# Patient Record
Sex: FEMALE | Race: WHITE | NOT HISPANIC OR LATINO | Employment: UNEMPLOYED | URBAN - METROPOLITAN AREA
[De-identification: names, ages, dates, MRNs, and addresses within clinical notes are randomized per-mention and may not be internally consistent; named-entity substitution may affect disease eponyms.]

---

## 2017-03-09 ENCOUNTER — OFFICE VISIT (OUTPATIENT)
Dept: INTERNAL MEDICINE | Facility: MEDICAL CENTER | Age: 62
End: 2017-03-09
Payer: MEDICAID

## 2017-03-09 VITALS
TEMPERATURE: 98.2 F | BODY MASS INDEX: 27.97 KG/M2 | DIASTOLIC BLOOD PRESSURE: 64 MMHG | RESPIRATION RATE: 17 BRPM | SYSTOLIC BLOOD PRESSURE: 98 MMHG | HEIGHT: 66 IN | WEIGHT: 174 LBS | OXYGEN SATURATION: 91 % | HEART RATE: 86 BPM

## 2017-03-09 DIAGNOSIS — S37.009A: ICD-10-CM

## 2017-03-09 DIAGNOSIS — Z85.850 HX OF THYROID CANCER: ICD-10-CM

## 2017-03-09 DIAGNOSIS — M67.40 GANGLION CYST: ICD-10-CM

## 2017-03-09 DIAGNOSIS — E78.5 HYPERLIPIDEMIA, UNSPECIFIED HYPERLIPIDEMIA TYPE: ICD-10-CM

## 2017-03-09 PROCEDURE — 99213 OFFICE O/P EST LOW 20 MIN: CPT | Mod: GE | Performed by: INTERNAL MEDICINE

## 2017-03-09 RX ORDER — SIMVASTATIN 40 MG
40 TABLET ORAL EVERY EVENING
Qty: 30 TAB | Refills: 11 | Status: SHIPPED | OUTPATIENT
Start: 2017-03-09 | End: 2018-04-05 | Stop reason: SDUPTHER

## 2017-03-09 ASSESSMENT — ACTIVITIES OF DAILY LIVING (ADL): TRANSPORTATION COMMENTS: NO CAR

## 2017-03-09 ASSESSMENT — PAIN SCALES - GENERAL: PAINLEVEL: NO PAIN

## 2017-03-09 ASSESSMENT — PATIENT HEALTH QUESTIONNAIRE - PHQ9: CLINICAL INTERPRETATION OF PHQ2 SCORE: 0

## 2017-03-09 NOTE — PROGRESS NOTES
"Subjective:   CC: follow up    Chastity Blanco is a 61 y.o. female who presents to the [facility] for a follow up visit.    She has a PMHx of Follicular thyroid cancer s/p post total thyroidectomy in 2006 and radiation, vitamin D deficiency, COPD, dyslipidemia, bipolar disorder and depression.    Patient reports to be doing well. She does mention about a small \"bump\" on left 5th digit which appeared a few months ago. She notes this is not painful or pruritic, denies any inciting trauma.   Discussed lab work with her. She continues to smoke less than half a pack a day now. Reports to be compliant with medications, except simvastatin as she did not realize she was supposed to continue.    She is following Dr. Box for depression and bipolar disorder. She's on olanzapine, benztropine and celexa.     Patient Active Problem List    Diagnosis Date Noted   • Hx of thyroid cancer 06/16/2016   • Vitamin D deficiency 06/16/2016   • Tobacco abuse 06/16/2016   • COPD (chronic obstructive pulmonary disease) (CMS-HCC) 06/16/2016   • Hyperlipidemia 06/16/2016   • Bipolar disorder (CMS-HCC) 06/16/2016   • Depression 06/16/2016       Current medicines (including changes today)  Allergies:Review of patient's allergies indicates no known allergies.  Current Outpatient Prescriptions   Medication Sig Dispense Refill   • citalopram (CELEXA) 40 MG Tab      • FLUVIRIN Suspension ADM 0.5ML IM UTD  0   • olanzapine (ZYPREXA) 10 MG tablet 10 mg.  4   • benztropine (COGENTIN) 0.5 MG Tab 0.5 mg.  4   • albuterol (PROVENTIL) 2.5mg/3ml Nebu Soln solution for nebulization 3 mL by Nebulization route every four hours as needed for Shortness of Breath. 75 mL 3   • citalopram (CELEXA) 10 MG tablet Take 1 Tab by mouth every day. 30 Tab 3   • divalproex (DEPAKOTE) 500 MG Tablet Delayed Response Take 1 Tab by mouth every day. One tablet by mouth once every night at bedtime 90 Tab 3   • levothyroxine (SYNTHROID) 175 MCG Tab Take 1 Tab by mouth " "Every morning on an empty stomach. 30 Tab 3   • simvastatin (ZOCOR) 40 MG Tab Take 1 Tab by mouth every evening. 30 Tab 3   • tiotropium (SPIRIVA) 18 MCG Cap Inhale 1 Cap by mouth every day. 30 Cap 3     No current facility-administered medications for this visit.     She  has a past medical history of Thyroid disorder (removed); thyroid cancer (6/16/2016); Vitamin D deficiency (6/16/2016); Tobacco abuse (6/16/2016); COPD (chronic obstructive pulmonary disease) (CMS-HCC) (6/16/2016); Hyperlipidemia (6/16/2016); Bipolar disorder (CMS-HCC) (6/16/2016); and Depression (6/16/2016). She also has no past medical history of Breast cancer (CMS-HCC).  Family History   Problem Relation Age of Onset   • Cancer Mother      Breast     No family status information on file.     Social History   Substance Use Topics   • Smoking status: Current Every Day Smoker -- 1.00 packs/day for 40 years     Types: Cigarettes   • Smokeless tobacco: Never Used   • Alcohol Use: No      Comment: occ.        Review of Systems:     Negative except as mentioned in HPI.    Constitutional: Denies fevers, Denies weight changes  Eyes: Denies changes in vision, no eye pain  Cardiovascular: Denies chest pain or palpitations   Respiratory: Denies shortness of breath , Denies cough  Gastrointestinal/Hepatic: Denies abdominal pain, nausea, vomiting, diarrhea, constipation or GI bleeding   Genitourinary: Denies bladder dysfunction, dysuria or frequency  Musculoskeletal/Rheum: Denies  joint pain and swelling   Skin/Breast: Denies rash  Neurological: Denies headache, confusion, memory loss or focal weakness/parasthesias  All other systems were reviewed and are negative (AMA/CMS criteria)               Objective:     Blood pressure 98/64, pulse 86, temperature 36.8 °C (98.2 °F), resp. rate 17, height 1.676 m (5' 6\"), weight 78.926 kg (174 lb), SpO2 91 %, not currently breastfeeding. Body mass index is 28.1 kg/(m^2).   Constitutional: Alert and oriented, No acute " distress   HEENT: Normocephalic, Atraumatic, Bilateral external ears normal, slight brownish discoloration on tongue, no tenderness, no oral thrush noted  Eyes: PERRL, EOMI, Conjunctiva normal, No discharge.   Neck: Normal range of motion, thyroidectomy scar noted  Cardiovascular: Normal heart rate, Normal rhythm, No murmurs    Lungs:  Clear to auscultation bilaterally  Skin: possibly ganglion cyst on left 5th digit   Neurologic: Normal motor function, No focal deficits noted, cranial nerves II through XII are normal  Psychiatric: Affect normal, Judgment normal, Mood normal.   Extremities: B/L Peripheral Pulses +, No Edema        Assessment and Plan:     1. Hyperlipidemia, unspecified hyperlipidemia type  Most recent lipid panel consistent with uncontrolled hyperlipidemia  Elevated cholesterol, triglycerides, LDL and low HDL level  Per patient she has not been taking her simvastatin as she was not aware she was supposed to continue. She notes that she did not get any refills and that she was supposed to take it  Discussed with patient to call pharmacy when she runs out of medications and future. Reordered simvastatin with 11 refills  Recommended to eat diet high in omega-3 fatty acids including avocado, fish oil, fish, and nuts  - simvastatin (ZOCOR) 40 MG Tab; Take 1 Tab by mouth every evening.  Dispense: 30 Tab; Refill: 11    2. Hx of Follicular thyroid cancer  Hypothyroidism s/p total thyroidectomy   Most recent Tg Ab 1; no thyrglobulin level done   Patient is following Dr. Jacinto (endocrinologist) and will get in touch with Dr. Jacinto regarding thyroglobulin antibody level and follow up  She is continued on levothyroxine 175 µg daily      3. Kidney injury, unspecified laterality, initial encounter  Mildly elevated Cr 1.08 compared to previous  Possibly due to dehydration; denies NSAID use. Not on any nephrotoxic medications  Instructed patient to drink plenty of water daily  Repeat chemistry prior to next  visit  - BASIC METABOLIC PANEL; Future    4. Ganglion cyst  Patient with likely ganglion cyst on left 5th digit; not painful or pruritic. No inciting trauma and no other precipitating factors. Has not changed or increased in size. No warmth or tenderness on palpation  Instructed patient we can continue to monitor. Gave her options that we can consider further testing including x-ray or ultrasound and possibly referral to surgeon or ortho for aspiration if needed.  We will continue to monitor for now      5. Tobacco abuse  Has been counseled multiple times previously on quitting smoking  She is informed about the risk factors of smoking and that we can try nicotine patches or gum whenever she is ready   She has cut down her smoking to 8 cig/day now       6. Preventive health care  Had mammogram last year  colonoscopy two yrs ago  Colonoscopy recommended in 8 yrs  Flu shot in Oct/16      Followup:   Therapy was discussed in detail.    Please note that this dictation was created using voice recognition software. I have made every reasonable attempt to correct obvious errors, but I expect that there are errors of grammar and possibly content that I did not discover before finalizing the note.

## 2017-03-09 NOTE — MR AVS SNAPSHOT
"        Chastity Holtanastasia   3/9/2017 1:15 PM   Office Visit   MRN: [id number]    Department:  [facility] - Internal Med   Dept Phone:  [phone]    Description:  Female : 1955   Provider:  Lucia Horan M.D.           Reason for Visit     Wound Infection left little finger infection mass times 2 months      Allergies as of 3/9/2017     No Known Allergies      You were diagnosed with     Hyperlipidemia, unspecified hyperlipidemia type   [1054378]       Hx of thyroid cancer   [303383]       Kidney injury, unspecified laterality, initial encounter   [634521]         Vital Signs     Blood Pressure Pulse Temperature Respirations Height Weight    98/64 mmHg 86 36.8 °C (98.2 °F) 17 1.676 m (5' 6\") 78.926 kg (174 lb)    Body Mass Index Oxygen Saturation Breastfeeding? Smoking Status          28.10 kg/m2 91% No Current Every Day Smoker        Basic Information     Date Of Birth Sex Race Ethnicity Preferred Language    1955 Female White Non- English      Your appointments     2017  1:45 PM   Established Patient with Lucia Horan M.D.   [facility] - Internal Medicine (--)    [address] [place] [address] [id number] [phone]           You will be receiving a confirmation call a few days before your appointment from our automated call confirmation system.              Problem List              ICD-10-CM Priority Class Noted - Resolved    Hx of thyroid cancer Z85.850   2016 - Present    Vitamin D deficiency E55.9   2016 - Present    Tobacco abuse Z72.0   2016 - Present    COPD (chronic obstructive pulmonary disease) (CMS-HCC) J44.9   2016 - Present    Hyperlipidemia E78.5   2016 - Present    Bipolar disorder (CMS-HCC) F31.9   2016 - Present    Depression F32.9   2016 - Present      Health Maintenance        Date Due Completion Dates    PAP SMEAR 10/16/1977 ---    COLONOSCOPY 10/16/2006 ---    MAMMOGRAM 2013, " 4/12/2011    IMM ZOSTER VACCINE 10/16/2016 ---    IMM DTaP/Tdap/Td Vaccine (2 - Td) 11/5/2017 11/5/2007            Current Immunizations     13-VALENT PCV PREVNAR 1/29/2015    Influenza Vaccine Quad Inj (Pf) 10/3/2016    Pneumococcal polysaccharide vaccine (PPSV-23) 3/31/2015    Tdap Vaccine 11/5/2007      Below and/or attached are the medications your provider expects you to take. Review all of your home medications and newly ordered medications with your provider and/or pharmacist. Follow medication instructions as directed by your provider and/or pharmacist. Please keep your medication list with you and share with your provider. Update the information when medications are discontinued, doses are changed, or new medications (including over-the-counter products) are added; and carry medication information at all times in the event of emergency situations     Allergies:  No Known Allergies          Medications  Valid as of: March 09, 2017 -  1:57 PM    Generic Name Brand Name Tablet Size Instructions for use    Albuterol Sulfate (Nebu Soln) PROVENTIL 2.5mg/3ml 3 mL by Nebulization route every four hours as needed for Shortness of Breath.        Benztropine Mesylate (Tab) COGENTIN 0.5 MG 0.5 mg.        Citalopram Hydrobromide (Tab) CELEXA 40 MG         Influenza Vac Typ A&B Surf Ant (Suspension) FLUVIRIN  ADM 0.5ML IM UTD        Levothyroxine Sodium (Tab) SYNTHROID 175 MCG Take 1 Tab by mouth Every morning on an empty stomach.        OLANZapine (Tab) ZYPREXA 10 MG 10 mg.        Simvastatin (Tab) ZOCOR 40 MG Take 1 Tab by mouth every evening.        Tiotropium Bromide Monohydrate (Cap) SPIRIVA 18 MCG Inhale 1 Cap by mouth every day.        .                 Medicines prescribed today were sent to:     [id number] - [place] - [address] [facility] [employer]    [address] [address]    Phone: [phone] Fax: [phone]    Open 24 Hours?: No      Medication refill  instructions:       If your prescription bottle indicates you have medication refills left, it is not necessary to call your provider’s office. Please contact your pharmacy and they will refill your medication.    If your prescription bottle indicates you do not have any refills left, you may request refills at any time through one of the following ways: The online [employer] system (except Urgent Care), by calling your provider’s office, or by asking your pharmacy to contact your provider’s office with a refill request. Medication refills are processed only during regular business hours and may not be available until the next business day. Your provider may request additional information or to have a follow-up visit with you prior to refilling your medication.   *Please Note: Medication refills are assigned a new Rx number when refilled electronically. Your pharmacy may indicate that no refills were authorized even though a new prescription for the same medication is available at the pharmacy. Please request the medicine by name with the pharmacy before contacting your provider for a refill.        Your To Do List     Future Labs/Procedures Complete By Expires    BASIC METABOLIC PANEL  As directed 3/9/2018      Instructions    Drink plenty of water  Get lab test before next visit  Follow up with Dr. Jacinto as scheduled            [facility] Status: Patient Declined        Quit Tobacco Information     Do you want to quit using tobacco?    Quitting tobacco decreases risks of cancer, heart and lung disease, increases life expectancy, improves sense of taste and smell, and increases spending money, among other benefits.    If you are thinking about quitting, we can help.  • [facility] Quit Tobacco Program: 133-228-5822  o Program occurs weekly for four weeks and includes pharmacist consultation on products to support quitting smoking or chewing tobacco. A provider referral is needed for pharmacist consultation.  • Tobacco Users  Help Hotline: -800-QUIT-NOW (622-3782) or https://[place].quitlogix.org/  o Free, confidential telephone and online coaching for [place] residents. Sessions are designed on a schedule that is convenient for you. Eligible clients receive free nicotine replacement therapy.  • Nationally: www.smokefree.gov  o Information and professional assistance to support both immediate and long-term needs as you become, and remain, a non-smoker. Smokefree.gov allows you to choose the help that best fits your needs.

## 2017-06-10 LAB
BUN SERPL-MCNC: 12 MG/DL (ref 8–27)
BUN/CREAT SERPL: 14 (ref 12–28)
CALCIUM SERPL-MCNC: 9.6 MG/DL (ref 8.7–10.3)
CHLORIDE SERPL-SCNC: 102 MMOL/L (ref 96–106)
CO2 SERPL-SCNC: 22 MMOL/L (ref 18–29)
CREAT SERPL-MCNC: 0.86 MG/DL (ref 0.57–1)
GLUCOSE SERPL-MCNC: 84 MG/DL (ref 65–99)
POTASSIUM SERPL-SCNC: 4.2 MMOL/L (ref 3.5–5.2)
SODIUM SERPL-SCNC: 142 MMOL/L (ref 134–144)

## 2017-06-21 ENCOUNTER — OFFICE VISIT (OUTPATIENT)
Dept: INTERNAL MEDICINE | Facility: MEDICAL CENTER | Age: 62
End: 2017-06-21
Payer: MEDICAID

## 2017-06-21 VITALS
DIASTOLIC BLOOD PRESSURE: 78 MMHG | RESPIRATION RATE: 18 BRPM | OXYGEN SATURATION: 93 % | HEIGHT: 66 IN | SYSTOLIC BLOOD PRESSURE: 112 MMHG | WEIGHT: 161.2 LBS | BODY MASS INDEX: 25.91 KG/M2 | TEMPERATURE: 97.5 F | HEART RATE: 85 BPM

## 2017-06-21 DIAGNOSIS — E78.5 HYPERLIPIDEMIA, UNSPECIFIED HYPERLIPIDEMIA TYPE: ICD-10-CM

## 2017-06-21 DIAGNOSIS — J44.9 CHRONIC OBSTRUCTIVE PULMONARY DISEASE, UNSPECIFIED COPD TYPE (HCC): ICD-10-CM

## 2017-06-21 DIAGNOSIS — Z85.850 HX OF THYROID CANCER: ICD-10-CM

## 2017-06-21 DIAGNOSIS — E03.9 HYPOTHYROIDISM, UNSPECIFIED TYPE: ICD-10-CM

## 2017-06-21 DIAGNOSIS — Z12.2 ENCOUNTER FOR SCREENING FOR LUNG CANCER: ICD-10-CM

## 2017-06-21 PROCEDURE — 99213 OFFICE O/P EST LOW 20 MIN: CPT | Mod: GE | Performed by: INTERNAL MEDICINE

## 2017-06-21 RX ORDER — TIOTROPIUM BROMIDE 18 UG/1
18 CAPSULE ORAL; RESPIRATORY (INHALATION) DAILY
Qty: 30 CAP | Refills: 5 | Status: SHIPPED | OUTPATIENT
Start: 2017-06-21 | End: 2018-12-20 | Stop reason: SDUPTHER

## 2017-06-21 ASSESSMENT — PATIENT HEALTH QUESTIONNAIRE - PHQ9: CLINICAL INTERPRETATION OF PHQ2 SCORE: 0

## 2017-06-21 ASSESSMENT — ACTIVITIES OF DAILY LIVING (ADL): TRANSPORTATION COMMENTS: NO CAR

## 2017-06-21 ASSESSMENT — PAIN SCALES - GENERAL: PAINLEVEL: NO PAIN

## 2017-06-21 NOTE — PROGRESS NOTES
Subjective:   CC: Follow up    Chatsity Blanco is a 61 y.o. female who presents to the [facility] for a follow up visit.    She has a PMHx of Follicular thyroid cancer s/p post total thyroidectomy in 2006 and radiation, low vitamin D, COPD, dyslipidemia, bipolar disorder and depression.    She reports she has been doing okay, however her father recently passed away one month ago and she is grieving. She reports to be doing okay though overall and says her mother is doing fine as well. She is usually accompanied to appointments by parents, but today her mother and brother-in-law.    She is following Dr. Box for depression and bipolar disorder. She's on olanzapine, benztropine and celexa.     Patient Active Problem List    Diagnosis Date Noted   • Hx of thyroid cancer 06/16/2016   • Vitamin D deficiency 06/16/2016   • Tobacco abuse 06/16/2016   • COPD (chronic obstructive pulmonary disease) (CMS-HCC) 06/16/2016   • Hyperlipidemia 06/16/2016   • Bipolar disorder (CMS-HCC) 06/16/2016   • Depression 06/16/2016       Current medicines (including changes today)  Allergies:Review of patient's allergies indicates no known allergies.  Current Outpatient Prescriptions   Medication Sig Dispense Refill   • levothyroxine (SYNTHROID) 175 MCG Tab TAKE 1 TABLET BY MOUTH EVERY MORNING ON AN EMPTY STOMACH 90 Tab 1   • simvastatin (ZOCOR) 40 MG Tab Take 1 Tab by mouth every evening. 30 Tab 11   • citalopram (CELEXA) 40 MG Tab      • FLUVIRIN Suspension ADM 0.5ML IM UTD  0   • olanzapine (ZYPREXA) 10 MG tablet 10 mg.  4   • benztropine (COGENTIN) 0.5 MG Tab 0.5 mg.  4   • albuterol (PROVENTIL) 2.5mg/3ml Nebu Soln solution for nebulization 3 mL by Nebulization route every four hours as needed for Shortness of Breath. 75 mL 3   • tiotropium (SPIRIVA) 18 MCG Cap Inhale 1 Cap by mouth every day. 30 Cap 3     No current facility-administered medications for this visit.     She  has a past medical history of Thyroid disorder  (removed); thyroid cancer (6/16/2016); Vitamin D deficiency (6/16/2016); Tobacco abuse (6/16/2016); COPD (chronic obstructive pulmonary disease) (CMS-[facility]) (6/16/2016); Hyperlipidemia (6/16/2016); Bipolar disorder (CMS-HCC) (6/16/2016); and Depression (6/16/2016). She also has no past medical history of Breast cancer (CMS-HCC).  Family History   Problem Relation Age of Onset   • Cancer Mother      Breast     No family status information on file.     Social History   Substance Use Topics   • Smoking status: Current Every Day Smoker -- 1.00 packs/day for 40 years     Types: Cigarettes   • Smokeless tobacco: Never Used   • Alcohol Use: No      Comment: occ.        Review of Systems:     Negative except as mentioned in HPI.    Constitutional: Denies fevers, Denies weight changes  Eyes: Denies changes in vision, no eye pain  Cardiovascular: Denies chest pain or palpitations   Respiratory: Denies shortness of breath , Denies cough  Gastrointestinal/Hepatic: Denies abdominal pain, nausea, vomiting, diarrhea, constipation or GI bleeding   Genitourinary: Denies bladder dysfunction, dysuria or frequency  Musculoskeletal/Rheum: Denies  joint pain and swelling   Skin/Breast: Denies rash  Neurological: Denies headache, confusion, memory loss or focal weakness/parasthesias  All other systems were reviewed and are negative (AMA/CMS criteria)               Objective:     There were no vitals taken for this visit. There is no weight on file to calculate BMI.   Constitutional: Alert and oriented, No acute distress   HEENT: Normocephalic, Atraumatic, Bilateral external ears normal, no tenderness, no oral thrush noted  Eyes: PERRL, EOMI, Conjunctiva normal, No discharge.   Neck: Normal range of motion, thyroidectomy scar noted  Cardiovascular: Normal heart rate, Normal rhythm, No murmurs    Lungs:  Clear to auscultation bilaterally  Neurologic: Normal motor function, No focal deficits noted, cranial nerves II through XII are  normal  Psychiatric: Affect normal, Judgment normal, Mood normal.   Extremities: B/L Peripheral Pulses +, No Edema        Assessment and Plan:     1. Chronic obstructive pulmonary disease, unspecified COPD type (CMS-HCC)  Well controlled on spiriva  Patient continues to be an active smoker smoking half pack per day, 45-pack-year smoking  We'll give low-dose CT chest without contrast as lung cancer screening  - CT-CHEST W/O LOW DOSE; Future    2. Hyperlipidemia, unspecified hyperlipidemia type  Patient undertake dyslipidemia, has been on statin therapy for about 6 months  Repeat lipid profile ordered to be done prior to next visit  Recommended to eat diet high in omega-3 fatty acids including avocado, fish oil, fish, and nuts  - CBC WITH DIFFERENTIAL; Future  - COMP METABOLIC PANEL; Future  - LIPID PROFILE; Future    3. Hypothyroidism, unspecified type  S/p follicular thyroid cancer and thyroidectomy  Most recent Tg Ab 1   Patient has appt with Dr. Jacinto (endocrinologist) in July  She is continued on levothyroxine 175 µg daily    4. Tobacco abuse  Has been counseled multiple times previously on quitting smoking  She is informed about the risk factors of smoking and that we can try nicotine patches or gum whenever she is ready   Smoking 1/2 PPD. 45 pack year smoking history. Low-dose CT chest ordered for lung cancer screening. Discussed with patient about risk and benefit of screening including finding benign nodules and need for further follow-up, and she agreed to get CT done.      5. Preventive health care  Had mammogram  02/2015  Colonoscopy 2011- hemorrhoids otherwise normal; recommended in 10 yrs   Flu shot in Oct/16      Followup:   Therapy was discussed in detail.    Please note that this dictation was created using voice recognition software. I have made every reasonable attempt to correct obvious errors, but I expect that there are errors of grammar and possibly content that I did not discover before  finalizing the note.

## 2017-06-21 NOTE — MR AVS SNAPSHOT
"        Chastity Blanco   2017 1:15 PM   Office Visit   MRN: [id number]    Department:  [facility] Med - Internal Med   Dept Phone:  [phone]    Description:  Female : 1955   Provider:  Lucia Horan M.D.           Reason for Visit     Hypertension thyroid    Hyperlipidemia review labs    Medication Refill           Allergies as of 2017     No Known Allergies      You were diagnosed with     Chronic obstructive pulmonary disease, unspecified COPD type (CMS-HCC)   [0191722]       Hyperlipidemia, unspecified hyperlipidemia type   [4601254]       Hx of thyroid cancer   [305308]       Hypothyroidism, unspecified type   [5132709]         Vital Signs     Blood Pressure Pulse Temperature Respirations Height Weight    112/78 mmHg 85 36.4 °C (97.5 °F) 18 1.676 m (5' 5.98\") 73.12 kg (161 lb 3.2 oz)    Body Mass Index Oxygen Saturation Breastfeeding? Smoking Status          26.03 kg/m2 93% No Current Every Day Smoker        Basic Information     Date Of Birth Sex Race Ethnicity Preferred Language    1955 Female White Non- English      Your appointments     Sep 21, 2017  1:15 PM   NEW TO YOU with Omega Ann M.D.   [facility] Med - Internal Medicine (--)    [address]  [address] [address] [phone]              Problem List              ICD-10-CM Priority Class Noted - Resolved    Hx of thyroid cancer Z85.850   2016 - Present    Vitamin D deficiency E55.9   2016 - Present    Tobacco abuse Z72.0   2016 - Present    COPD (chronic obstructive pulmonary disease) (CMS-HCC) J44.9   2016 - Present    Hyperlipidemia E78.5   2016 - Present    Bipolar disorder (CMS-HCC) F31.9   2016 - Present    Depression F32.9   2016 - Present      Health Maintenance        Date Due Completion Dates    PAP SMEAR 10/16/1977 ---    MAMMOGRAM 2013, 2011    IMM ZOSTER VACCINE 10/16/2016 ---    IMM DTaP/Tdap/Td Vaccine (2 - Td) " 11/5/2017 11/5/2007    COLONOSCOPY 4/21/2021 4/21/2011            Current Immunizations     13-VALENT PCV PREVNAR 1/29/2015    Influenza Vaccine Quad Inj (Pf) 10/3/2016    Pneumococcal polysaccharide vaccine (PPSV-23) 3/31/2015    Tdap Vaccine 11/5/2007      Below and/or attached are the medications your provider expects you to take. Review all of your home medications and newly ordered medications with your provider and/or pharmacist. Follow medication instructions as directed by your provider and/or pharmacist. Please keep your medication list with you and share with your provider. Update the information when medications are discontinued, doses are changed, or new medications (including over-the-counter products) are added; and carry medication information at all times in the event of emergency situations     Allergies:  No Known Allergies          Medications  Valid as of: June 21, 2017 -  1:46 PM    Generic Name Brand Name Tablet Size Instructions for use    Albuterol Sulfate (Nebu Soln) PROVENTIL 2.5mg/3ml 3 mL by Nebulization route every four hours as needed for Shortness of Breath.        Benztropine Mesylate (Tab) COGENTIN 0.5 MG 0.5 mg.        Citalopram Hydrobromide (Tab) CELEXA 40 MG         Levothyroxine Sodium (Tab) SYNTHROID 175 MCG TAKE 1 TABLET BY MOUTH EVERY MORNING ON AN EMPTY STOMACH        OLANZapine (Tab) ZYPREXA 10 MG 10 mg.        Simvastatin (Tab) ZOCOR 40 MG Take 1 Tab by mouth every evening.        Tiotropium Bromide Monohydrate (Cap) SPIRIVA 18 MCG Inhale 1 Cap by mouth every day.        .                 Medicines prescribed today were sent to:     [facility] [address] [place] [address] MENA SANCHEZ - [address] AT [facility]    [address] [address]    Phone: [phone] Fax: [phone]    Open 24 Hours?: No      Medication refill instructions:       If your prescription bottle indicates you have medication refills left, it is not necessary to call your provider’s  office. Please contact your pharmacy and they will refill your medication.    If your prescription bottle indicates you do not have any refills left, you may request refills at any time through one of the following ways: The online Ad[facility] system (except Urgent Care), by calling your provider’s office, or by asking your pharmacy to contact your provider’s office with a refill request. Medication refills are processed only during regular business hours and may not be available until the next business day. Your provider may request additional information or to have a follow-up visit with you prior to refilling your medication.   *Please Note: Medication refills are assigned a new Rx number when refilled electronically. Your pharmacy may indicate that no refills were authorized even though a new prescription for the same medication is available at the pharmacy. Please request the medicine by name with the pharmacy before contacting your provider for a refill.        Your To Do List     Future Labs/Procedures Complete By Expires    CBC WITH DIFFERENTIAL  As directed 6/21/2018    COMP METABOLIC PANEL  As directed 6/21/2018    CT-CHEST W/O LOW DOSE  As directed 6/21/2018    LIPID PROFILE  As directed 6/22/2018         Adly Status: Patient Declined        Quit Tobacco Information     Do you want to quit using tobacco?    Quitting tobacco decreases risks of cancer, heart and lung disease, increases life expectancy, improves sense of taste and smell, and increases spending money, among other benefits.    If you are thinking about quitting, we can help.  • [facility] Quit Tobacco Program: [phone]  o Program occurs weekly for four weeks and includes pharmacist consultation on products to support quitting smoking or chewing tobacco. A provider referral is needed for pharmacist consultation.  • Tobacco Users Help Hotline: 8-242-QUIT-NOW (438-2401) or https://[place].quitlogix.org/  o Free, confidential telephone and online  coaching for [place] residents. Sessions are designed on a schedule that is convenient for you. Eligible clients receive free nicotine replacement therapy.  • Nationally: www.smokefree.gov  o Information and professional assistance to support both immediate and long-term needs as you become, and remain, a non-smoker. Smokefree.gov allows you to choose the help that best fits your needs.

## 2017-10-14 LAB
ALBUMIN SERPL-MCNC: 4.3 G/DL (ref 3.6–4.8)
ALBUMIN/GLOB SERPL: 1.7 {RATIO} (ref 1.2–2.2)
ALP SERPL-CCNC: 79 IU/L (ref 39–117)
ALT SERPL-CCNC: 9 IU/L (ref 0–32)
AST SERPL-CCNC: 14 IU/L (ref 0–40)
BASOPHILS # BLD AUTO: 0 X10E3/UL (ref 0–0.2)
BASOPHILS NFR BLD AUTO: 0 %
BILIRUB SERPL-MCNC: 0.4 MG/DL (ref 0–1.2)
BUN SERPL-MCNC: 13 MG/DL (ref 8–27)
BUN/CREAT SERPL: 14 (ref 12–28)
CALCIUM SERPL-MCNC: 9.3 MG/DL (ref 8.7–10.3)
CHLORIDE SERPL-SCNC: 103 MMOL/L (ref 96–106)
CHOLEST SERPL-MCNC: 156 MG/DL (ref 100–199)
CO2 SERPL-SCNC: 24 MMOL/L (ref 18–29)
COMMENT 011824: NORMAL
CREAT SERPL-MCNC: 0.91 MG/DL (ref 0.57–1)
EOSINOPHIL # BLD AUTO: 0.1 X10E3/UL (ref 0–0.4)
EOSINOPHIL NFR BLD AUTO: 1 %
ERYTHROCYTE [DISTWIDTH] IN BLOOD BY AUTOMATED COUNT: 13.3 % (ref 12.3–15.4)
GLOBULIN SER CALC-MCNC: 2.6 G/DL (ref 1.5–4.5)
GLUCOSE SERPL-MCNC: 91 MG/DL (ref 65–99)
HCT VFR BLD AUTO: 48.7 % (ref 34–46.6)
HDLC SERPL-MCNC: 41 MG/DL
HGB BLD-MCNC: 16.1 G/DL (ref 11.1–15.9)
IMM GRANULOCYTES # BLD: 0 X10E3/UL (ref 0–0.1)
IMM GRANULOCYTES NFR BLD: 0 %
IMMATURE CELLS  115398: ABNORMAL
LDLC SERPL CALC-MCNC: 88 MG/DL (ref 0–99)
LYMPHOCYTES # BLD AUTO: 2.8 X10E3/UL (ref 0.7–3.1)
LYMPHOCYTES NFR BLD AUTO: 29 %
MCH RBC QN AUTO: 29.8 PG (ref 26.6–33)
MCHC RBC AUTO-ENTMCNC: 33.1 G/DL (ref 31.5–35.7)
MCV RBC AUTO: 90 FL (ref 79–97)
MONOCYTES # BLD AUTO: 0.7 X10E3/UL (ref 0.1–0.9)
MONOCYTES NFR BLD AUTO: 7 %
MORPHOLOGY BLD-IMP: ABNORMAL
NEUTROPHILS # BLD AUTO: 6 X10E3/UL (ref 1.4–7)
NEUTROPHILS NFR BLD AUTO: 63 %
NRBC BLD AUTO-RTO: ABNORMAL %
PLATELET # BLD AUTO: 175 X10E3/UL (ref 150–379)
POTASSIUM SERPL-SCNC: 4.2 MMOL/L (ref 3.5–5.2)
PROT SERPL-MCNC: 6.9 G/DL (ref 6–8.5)
RBC # BLD AUTO: 5.41 X10E6/UL (ref 3.77–5.28)
SODIUM SERPL-SCNC: 142 MMOL/L (ref 134–144)
TRIGL SERPL-MCNC: 134 MG/DL (ref 0–149)
VLDLC SERPL CALC-MCNC: 27 MG/DL (ref 5–40)
WBC # BLD AUTO: 9.6 X10E3/UL (ref 3.4–10.8)

## 2017-10-25 ENCOUNTER — OFFICE VISIT (OUTPATIENT)
Dept: INTERNAL MEDICINE | Facility: MEDICAL CENTER | Age: 62
End: 2017-10-25
Payer: MEDICAID

## 2017-10-25 VITALS
HEIGHT: 66 IN | DIASTOLIC BLOOD PRESSURE: 77 MMHG | SYSTOLIC BLOOD PRESSURE: 102 MMHG | WEIGHT: 153 LBS | TEMPERATURE: 98.4 F | HEART RATE: 85 BPM | OXYGEN SATURATION: 92 % | BODY MASS INDEX: 24.59 KG/M2

## 2017-10-25 DIAGNOSIS — F31.70 BIPOLAR DISORDER IN FULL REMISSION, MOST RECENT EPISODE UNSPECIFIED TYPE (HCC): ICD-10-CM

## 2017-10-25 DIAGNOSIS — Z72.0 TOBACCO ABUSE: ICD-10-CM

## 2017-10-25 DIAGNOSIS — J44.9 CHRONIC OBSTRUCTIVE PULMONARY DISEASE, UNSPECIFIED COPD TYPE (HCC): ICD-10-CM

## 2017-10-25 DIAGNOSIS — F32.A DEPRESSION, UNSPECIFIED DEPRESSION TYPE: ICD-10-CM

## 2017-10-25 DIAGNOSIS — Z85.850 HX OF THYROID CANCER: ICD-10-CM

## 2017-10-25 DIAGNOSIS — E78.5 HYPERLIPIDEMIA, UNSPECIFIED HYPERLIPIDEMIA TYPE: ICD-10-CM

## 2017-10-25 PROCEDURE — 99213 OFFICE O/P EST LOW 20 MIN: CPT | Mod: GE | Performed by: INTERNAL MEDICINE

## 2017-10-25 NOTE — PATIENT INSTRUCTIONS
Please follow up with referral to lung cancer screening program.    Please get labs done prior to next visit.

## 2017-10-25 NOTE — PROGRESS NOTES
Established Patient    Chastity presents today with the following:    CC: Follow up on dyslipidemia and hypothyroidism    HPI: Ms Blanco is a pleasant 62-year-old female with past medical history significant for followed for thyroid cancer status post total thyroidectomy in 2006 and radiation, COPD, dyslipidemia, bipolar disorder, tobacco abuse, depression and history of vitamin D deficiency here for follow-up visit.    She says that is doing well, has no recent episodes of shortness of breath and she is using Spiriva daily. She is usually accompanied by her mother or brother-in-law to appointments, but today she came by herself. She continues to smoke cigarettes and is well aware of its risks and side effects.    Reports she noticed a small bump in her left fifth finger, since 6 months. Noticed very little growth but has no pain or tenderness. The mobility of her fingers not affected. She states doesn't bother her.    She is following Dr. Bxo for depression and bipolar disorder. She is on olanzapine, benztropine and Celexa.    She has lost weight in the recent 6 months, but she reports it to be intentional by change in her diet and exercise.      Patient Active Problem List    Diagnosis Date Noted   • Hx of thyroid cancer 06/16/2016   • Vitamin D deficiency 06/16/2016   • Tobacco abuse 06/16/2016   • COPD (chronic obstructive pulmonary disease) (CMS-HCC) 06/16/2016   • Hyperlipidemia 06/16/2016   • Bipolar disorder (CMS-HCC) 06/16/2016   • Depression 06/16/2016       Current Outpatient Prescriptions   Medication Sig Dispense Refill   • levothyroxine (SYNTHROID) 175 MCG Tab TAKE 1 TABLET BY MOUTH EVERY MORNING ON AN EMPTY STOMACH 90 Tab 1   • tiotropium (SPIRIVA) 18 MCG Cap Inhale 1 Cap by mouth every day. 30 Cap 5   • simvastatin (ZOCOR) 40 MG Tab Take 1 Tab by mouth every evening. 30 Tab 11   • citalopram (CELEXA) 40 MG Tab      • olanzapine (ZYPREXA) 10 MG tablet 10 mg.  4   • benztropine (COGENTIN) 0.5 MG  "Tab 0.5 mg.  4   • albuterol (PROVENTIL) 2.5mg/3ml Nebu Soln solution for nebulization 3 mL by Nebulization route every four hours as needed for Shortness of Breath. 75 mL 3     No current facility-administered medications for this visit.        Social History     Social History   • Marital status: Single     Spouse name: N/A   • Number of children: N/A   • Years of education: N/A     Occupational History   • Not on file.     Social History Main Topics   • Smoking status: Current Every Day Smoker     Packs/day: 0.50     Years: 40.00     Types: Cigarettes   • Smokeless tobacco: Never Used   • Alcohol use No      Comment: occ.   • Drug use: No   • Sexual activity: Not on file     Other Topics Concern   • Not on file     Social History Narrative   • No narrative on file       Family History   Problem Relation Age of Onset   • Cancer Mother      Breast       ROS: As per HPI. Additional pertinent symptoms as noted below.    Negative except as mentioned in HPI.     Constitutional: Denies fevers, Reports intentional weight loss.  Eyes: Denies changes in vision, no eye pain  Cardiovascular: Denies chest pain or palpitations   Respiratory: Denies shortness of breath , Denies cough  Gastrointestinal/Hepatic: Denies abdominal pain, nausea, vomiting, diarrhea, constipation or GI bleeding   Genitourinary: Denies bladder dysfunction, dysuria or frequency  Musculoskeletal/Rheum: Denies  joint pain and swelling   Skin/Breast: Denies rash  Neurological: Denies headache, confusion, memory loss or focal weakness/parasthesias  All other systems were reviewed and are negative (AMA/CMS criteria)              /77   Pulse 85   Temp 36.9 °C (98.4 °F)   Ht 1.676 m (5' 5.98\")   Wt 69.4 kg (153 lb)   SpO2 92%   BMI 24.71 kg/m²     Physical Exam  General:  Alert and oriented, No apparent distress.    Eyes: Pupils equal and reactive. No scleral icterus.    Throat: Clear no erythema or exudates noted.    Neck: Supple. No " lymphadenopathy noted. Thyroid not enlarged.    Lungs: Clear to auscultation and percussion bilaterally.    Cardiovascular: Regular rate and rhythm. No murmurs, rubs or gallops.    Abdomen:  Benign. No rebound or guarding noted.    Extremities: No clubbing, cyanosis, edema.    Skin: Clear. No rash or suspicious skin lesions noted.    Left 5th finger: Swelling bony hard over DIP, immobile, and no tenderness      Note: I have reviewed all pertinent labs and diagnostic tests associated with this visit with specific comments listed under the assessment and plan below      Assessment and Plan    1. Chronic obstructive pulmonary disease, unspecified COPD type (CMS-HCC)  Well controlled on spiriva  Patient continues to be an active smoker smoking half pack per day, 45-pack-year smoking  She is currently stable.    2. Tobacco abuse  Has been counseled multiple times previously on quitting smoking  She is informed about the risk factors of smoking and has tried nicotine gum and patches in the past.  She is concerned about interactions of Chantix with her psychiatry medications and will see her psychiatrist about it.  - REFERRAL TO LUNG CANCER SCREENING PROGRAM    3. Bipolar disorder in full remission, most recent episode unspecified type (CMS-HCC)  Is being followed by psychiatry.  Currently stable.  Continue olanzapine, benztropine and Celexa.    4. Depression, unspecified depression type  Currently stable.  Denies alcohol or homicidal ideation.  Follow-up with psychiatry.    5. Hyperlipidemia, unspecified hyperlipidemia type  Recent lipid file shows good control of cholesterol.  Is on simvastatin 40 mg daily.  Continue current dose.    6. 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 Most recent Tg Ab 1.  We'll get thyroid tests again. Is following with Dr. Jacinto whom she reports has an appointment in January.  - TSH WITH REFLEX TO FT4; Future    7. Swelling on left  5th DIP joint  Swelling appears to be [place] node.  Asymptomatically now  Monitor for now.    8. Visit for preventive health examination  Reports Pap smear done to be done 3-5 years ago, reports that basically normal.  Mammogram done in 2015, needs another one done. Order in place.  Colonoscopy done in 2011 and reported to be normal, repeat to be done in 2021.  Received flu vaccine at pharmacy.      Followup: Return in about 3 months (around 1/25/2018), or if symptoms worsen or fail to improve, for Long.      Signed by: Omega Ann M.D.

## 2018-01-24 LAB — TSH SERPL DL<=0.005 MIU/L-ACNC: 0.75 UIU/ML (ref 0.45–4.5)

## 2018-02-07 ENCOUNTER — OFFICE VISIT (OUTPATIENT)
Dept: INTERNAL MEDICINE | Facility: MEDICAL CENTER | Age: 63
End: 2018-02-07
Payer: MEDICAID

## 2018-02-07 VITALS
HEIGHT: 66 IN | HEART RATE: 72 BPM | DIASTOLIC BLOOD PRESSURE: 60 MMHG | SYSTOLIC BLOOD PRESSURE: 108 MMHG | WEIGHT: 154.6 LBS | BODY MASS INDEX: 24.85 KG/M2 | OXYGEN SATURATION: 98 % | TEMPERATURE: 98.7 F | RESPIRATION RATE: 16 BRPM

## 2018-02-07 DIAGNOSIS — Z78.0 ASYMPTOMATIC MENOPAUSAL STATE: ICD-10-CM

## 2018-02-07 DIAGNOSIS — F03.90 DEMENTIA WITHOUT BEHAVIORAL DISTURBANCE, UNSPECIFIED DEMENTIA TYPE: ICD-10-CM

## 2018-02-07 DIAGNOSIS — J44.9 CHRONIC OBSTRUCTIVE PULMONARY DISEASE, UNSPECIFIED COPD TYPE (HCC): ICD-10-CM

## 2018-02-07 DIAGNOSIS — Z12.31 SCREENING MAMMOGRAM, ENCOUNTER FOR: ICD-10-CM

## 2018-02-07 DIAGNOSIS — Z72.0 TOBACCO ABUSE: ICD-10-CM

## 2018-02-07 PROCEDURE — 99214 OFFICE O/P EST MOD 30 MIN: CPT | Mod: GC | Performed by: INTERNAL MEDICINE

## 2018-02-07 RX ORDER — MEMANTINE HYDROCHLORIDE 10 MG/1
10 TABLET ORAL 2 TIMES DAILY
COMMUNITY
Start: 2018-02-02 | End: 2018-12-20

## 2018-02-07 ASSESSMENT — PAIN SCALES - GENERAL: PAINLEVEL: NO PAIN

## 2018-02-08 NOTE — PROGRESS NOTES
"Established Patient    Chastity presents today with the following:    CC: Follow up visit; Hypothyroidism    HPI: Ms Blanco is a pleasant 62-year-old female with past medical history significant for followed for thyroid cancer status post total thyroidectomy in 2006 and radiation, COPD, dyslipidemia, bipolar disorder, tobacco abuse, depression and history of vitamin D deficiency here for follow-up visit.    She says that is doing well, has no recent episodes of shortness of breath and she is using Spiriva daily. She is usually accompanied by her mother or brother-in-law to appointments, but today she came by herself. She continues to smoke cigarettes and is well aware of its risks and side effects.    She is following Dr. Box for depression and bipolar disorder. She is on olanzapine, benztropine and Celexa. She has also been started on Memantine by her psychiatrist for \"memory problems\".    She has lost weight in the recent 6 months, but she reports it to be intentional by change in her diet and exercise.      Patient Active Problem List    Diagnosis Date Noted   • Hx of thyroid cancer 06/16/2016   • Vitamin D deficiency 06/16/2016   • Tobacco abuse 06/16/2016   • COPD (chronic obstructive pulmonary disease) (CMS-HCC) 06/16/2016   • Hyperlipidemia 06/16/2016   • Bipolar disorder (CMS-HCC) 06/16/2016   • Depression 06/16/2016       Current Outpatient Prescriptions   Medication Sig Dispense Refill   • levothyroxine (SYNTHROID) 175 MCG Tab TAKE 1 TABLET BY MOUTH ONCE DAILY IN THE MORNING ON AN EMPTY STOMACH 90 Tab 3   • tiotropium (SPIRIVA) 18 MCG Cap Inhale 1 Cap by mouth every day. 30 Cap 5   • simvastatin (ZOCOR) 40 MG Tab Take 1 Tab by mouth every evening. 30 Tab 11   • citalopram (CELEXA) 40 MG Tab      • olanzapine (ZYPREXA) 10 MG tablet 10 mg.  4   • benztropine (COGENTIN) 0.5 MG Tab 0.5 mg.  4   • albuterol (PROVENTIL) 2.5mg/3ml Nebu Soln solution for nebulization 3 mL by Nebulization route every four " "hours as needed for Shortness of Breath. 75 mL 3   • memantine (NAMENDA) 10 MG Tab Take 10 mg by mouth 2 Times a Day.       No current facility-administered medications for this visit.        Social History     Social History   • Marital status: Single     Spouse name: N/A   • Number of children: N/A   • Years of education: N/A     Occupational History   • Not on file.     Social History Main Topics   • Smoking status: Current Every Day Smoker     Packs/day: 0.50     Years: 40.00     Types: Cigarettes   • Smokeless tobacco: Never Used   • Alcohol use No      Comment: occ.   • Drug use: No   • Sexual activity: Not on file     Other Topics Concern   • Not on file     Social History Narrative   • No narrative on file       Family History   Problem Relation Age of Onset   • Cancer Mother      Breast       ROS: As per HPI.     /60   Pulse 72   Temp 37.1 °C (98.7 °F)   Resp 16   Ht 1.676 m (5' 5.98\")   Wt 70.1 kg (154 lb 9.6 oz)   SpO2 98%   BMI 24.97 kg/m²     Physical Exam  General:  Alert and oriented, No apparent distress.    Eyes: Pupils equal and reactive. No scleral icterus.    Throat: Clear no erythema or exudates noted.    Neck: Supple. No lymphadenopathy noted. Thyroid not enlarged.    Lungs: Clear to auscultation and percussion bilaterally.    Cardiovascular: Regular rate and rhythm. No murmurs, rubs or gallops.    Abdomen:  Benign. No rebound or guarding noted.    Extremities: No clubbing, cyanosis, edema.    Skin: Clear. No rash or suspicious skin lesions noted.      Note: I have reviewed all pertinent labs and diagnostic tests associated with this visit with specific comments listed under the assessment and plan below      Assessment and Plan    1. Chronic obstructive pulmonary disease, unspecified COPD type (CMS-HCC)  Well controlled on spiriva  Patient continues to be an active smoker smoking half pack per day, 45-pack-year smoking  She is currently stable.    2. Tobacco abuse  Has been " counseled multiple times previously on quitting smoking  She is informed about the risk factors of smoking and has tried nicotine gum and patches in the past.  She is concerned about interactions of Chantix with her psychiatry medications and will see her psychiatrist about it.  - REFERRAL TO LUNG CANCER SCREENING PROGRAM    3. Bipolar disorder in full remission, most recent episode unspecified type (CMS-HCC)  Is being followed by psychiatry.  Currently stable.  Continue olanzapine, benztropine and Celexa.    4. Depression, unspecified depression type  Currently stable.  Denies alcohol or homicidal ideation.  Follow-up with psychiatry.    5. Dementia  Diagnosed by her psychiatrist.  On benztropine and memantine.  MMSE to be obtained next visit.    6. Hyperlipidemia, unspecified hyperlipidemia type  Recent lipid file shows good control of cholesterol.  Is on simvastatin 40 mg daily.  Continue current dose.    7. 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 Most recent Tg Ab 1.  TSH in normal range.  Continue levothyroxine 175 mcg.    8. Visit for preventive health examination  Reports Pap smear done to be done 3 years ago, reports that it was normal.  Mammogram done in 2015, needs another one done. Order in place.  Colonoscopy done in 2011 and reported to be normal, repeat to be done in 2021.  Received flu vaccine at pharmacy.         Followup: Return in about 4 months (around 6/7/2018).      Signed by: Omega Ann M.D.

## 2018-02-13 ENCOUNTER — TELEPHONE (OUTPATIENT)
Dept: HEMATOLOGY ONCOLOGY | Facility: MEDICAL CENTER | Age: 63
End: 2018-02-13

## 2018-02-13 NOTE — TELEPHONE ENCOUNTER
Received referral to lung cancer screening program.  Chart review to assess for lung cancer screening program eligibility.   1. Age 55-77 yrs of age? Yes 62 y.o.  2. 30 pack year hx of smoking, or greater? Yes 1/2-1 ppdx 40 yrs= 30pkyr hx  3. Current smoker or if quit, has pt quit within last 15 yrs?Yes  Current Smoker  4. Any signs or symptoms of lung cancer? None noted  COPD  5. Previous history of lung cancer? None noted  Hx of Thyroid ca-2006  6. Chest CT within past 12 mos.? None noted  Patient does meet eligibility criteria. [facility] scheduling notified to schedule the shared decision making visit.

## 2018-05-09 ENCOUNTER — TELEPHONE (OUTPATIENT)
Dept: INTERNAL MEDICINE | Facility: MEDICAL CENTER | Age: 63
End: 2018-05-09

## 2018-06-27 ENCOUNTER — OFFICE VISIT (OUTPATIENT)
Dept: INTERNAL MEDICINE | Facility: MEDICAL CENTER | Age: 63
End: 2018-06-27
Payer: MEDICAID

## 2018-06-27 VITALS
HEIGHT: 66 IN | SYSTOLIC BLOOD PRESSURE: 120 MMHG | HEART RATE: 89 BPM | BODY MASS INDEX: 25.23 KG/M2 | DIASTOLIC BLOOD PRESSURE: 88 MMHG | WEIGHT: 157 LBS | TEMPERATURE: 96.8 F | OXYGEN SATURATION: 91 %

## 2018-06-27 DIAGNOSIS — F31.70 BIPOLAR DISORDER IN FULL REMISSION, MOST RECENT EPISODE UNSPECIFIED TYPE (HCC): ICD-10-CM

## 2018-06-27 DIAGNOSIS — F03.90 DEMENTIA WITHOUT BEHAVIORAL DISTURBANCE, UNSPECIFIED DEMENTIA TYPE: ICD-10-CM

## 2018-06-27 DIAGNOSIS — E78.5 HYPERLIPIDEMIA, UNSPECIFIED HYPERLIPIDEMIA TYPE: ICD-10-CM

## 2018-06-27 DIAGNOSIS — Z85.850 HX OF THYROID CANCER: ICD-10-CM

## 2018-06-27 DIAGNOSIS — F32.A DEPRESSION, UNSPECIFIED DEPRESSION TYPE: ICD-10-CM

## 2018-06-27 PROCEDURE — 99213 OFFICE O/P EST LOW 20 MIN: CPT | Mod: GE | Performed by: INTERNAL MEDICINE

## 2018-06-27 ASSESSMENT — PATIENT HEALTH QUESTIONNAIRE - PHQ9: CLINICAL INTERPRETATION OF PHQ2 SCORE: 0

## 2018-06-27 NOTE — PROGRESS NOTES
Established Patient    Chastity presents today with the following:    CC: Dementia; Acquired Hypothyroidism;     HPI: Ms. Blanco is a very pleasant 31 cancer status post her neck maintenance examination radiation, COPD, ischemic, bipolar disorder, tobacco abuse, depression and history of vitamin D deficiency is here for follow-up visit.    She's doing ball, no recent episodes of shortness of breath and is using Spiriva daily. She is usually accompanied by her mother or brother a lot appointments but today she came by herself. She continues to smoke cigarettes and is well aware of its risks and danger.    Simi Box for depression and bipolar disorder. She's in olanzapine, benztropine, Celexa was also started on Memantine for memory problems.      Patient Active Problem List    Diagnosis Date Noted   • Dementia 02/07/2018   • Hx of thyroid cancer 06/16/2016   • Vitamin D deficiency 06/16/2016   • Tobacco abuse 06/16/2016   • COPD (chronic obstructive pulmonary disease) ([facility]) 06/16/2016   • Hyperlipidemia 06/16/2016   • Bipolar disorder ([facility]) 06/16/2016   • Depression 06/16/2016       Current Outpatient Prescriptions   Medication Sig Dispense Refill   • simvastatin (ZOCOR) 40 MG Tab TAKE 1 TABLET BY MOUTH ONCE DAILY IN THE EVENING 90 Tab 1   • memantine (NAMENDA) 10 MG Tab Take 10 mg by mouth 2 Times a Day.     • levothyroxine (SYNTHROID) 175 MCG Tab TAKE 1 TABLET BY MOUTH ONCE DAILY IN THE MORNING ON AN EMPTY STOMACH 90 Tab 3   • tiotropium (SPIRIVA) 18 MCG Cap Inhale 1 Cap by mouth every day. 30 Cap 5   • citalopram (CELEXA) 40 MG Tab      • olanzapine (ZYPREXA) 10 MG tablet 10 mg.  4   • benztropine (COGENTIN) 0.5 MG Tab 0.5 mg.  4   • albuterol (PROVENTIL) 2.5mg/3ml Nebu Soln solution for nebulization 3 mL by Nebulization route every four hours as needed for Shortness of Breath. 75 mL 3     No current facility-administered medications for this visit.        Social History     Social History   • Marital  "status: Single     Spouse name: N/A   • Number of children: N/A   • Years of education: N/A     Occupational History   • Not on file.     Social History Main Topics   • Smoking status: Current Every Day Smoker     Packs/day: 0.50     Years: 40.00     Types: Cigarettes   • Smokeless tobacco: Never Used   • Alcohol use No   • Drug use: No   • Sexual activity: Not on file     Other Topics Concern   • Not on file     Social History Narrative   • No narrative on file       Family History   Problem Relation Age of Onset   • Cancer Mother      Breast       ROS: As per HPI. Additional pertinent symptoms as noted below.        /88   Pulse 89   Temp 36 °C (96.8 °F)   Ht 1.676 m (5' 5.98\")   Wt 71.2 kg (157 lb)   SpO2 91%   BMI 25.36 kg/m²     Physical Exam  General:  Alert and oriented, No apparent distress.    Eyes: Pupils equal and reactive. No scleral icterus.    Throat: Clear no erythema or exudates noted.    Neck: Supple. No lymphadenopathy noted. Thyroid not enlarged.    Lungs: Clear to auscultation and percussion bilaterally.    Cardiovascular: Regular rate and rhythm. No murmurs, rubs or gallops.    Abdomen:  Benign. No rebound or guarding noted.    Extremities: No clubbing, cyanosis, edema.    Skin: Clear. No rash or suspicious skin lesions noted.        Note: I have reviewed all pertinent labs and diagnostic tests associated with this visit with specific comments listed under the assessment and plan below      Assessment and Plan    1. Chronic obstructive pulmonary disease, unspecified COPD type (CMS-HCC)  Well controlled on spiriva  Patient continues to be an active smoker smoking half pack per day, 45-pack-year smoking  She is currently stable.    2. Tobacco abuse  Has been counseled multiple times previously on quitting smoking  She is informed about the risk factors of smoking and has tried nicotine gum and patches in the past.  She is concerned about interactions of Chantix with her psychiatry " medications and will see her psychiatrist about it.    3. Bipolar disorder in full remission, most recent episode unspecified type (CMS-HCC)  Is being followed by psychiatry.  Currently stable.  Continue olanzapine, benztropine and Celexa.    4. Depression, unspecified depression type  Currently stable.  Denies alcohol or homicidal ideation.  Follow-up with psychiatry.    5. Dementia  Diagnosed by her psychiatrist.  On benztropine and memantine.  MMSE is 29, patient does not likely have dementia.  Discussed with her to stop mematamine and benztropine.  Patient wants to discuss with her psychiatrist regarding this.    6. Hyperlipidemia, unspecified hyperlipidemia type  Recent lipid file shows good control of cholesterol.  Is on simvastatin 40 mg daily.  Continue current dose.  Recheck labs next visit.    7. 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 Most recent Tg Ab 1.  TSH in normal range previously. TSH to be rechecked next visit.  Continue levothyroxine 175 mcg.    8. Visit for preventive health examination  Reports Pap smear done to be done 4 years ago, reports that it was normal.  To be done again in 2019  Mammogram done in 2015, needs another one done. Order in place.  Colonoscopy done in 2011 and reported to be normal, repeat to be done in 2021.  Will get labs next visit.      Followup: Return in about 6 months (around 12/27/2018), or if symptoms worsen or fail to improve.      Signed by: Omega Ann M.D.

## 2018-12-20 ENCOUNTER — OFFICE VISIT (OUTPATIENT)
Dept: INTERNAL MEDICINE | Facility: MEDICAL CENTER | Age: 63
End: 2018-12-20
Payer: MEDICAID

## 2018-12-20 VITALS
DIASTOLIC BLOOD PRESSURE: 80 MMHG | HEIGHT: 66 IN | TEMPERATURE: 98.6 F | OXYGEN SATURATION: 92 % | HEART RATE: 90 BPM | SYSTOLIC BLOOD PRESSURE: 125 MMHG | BODY MASS INDEX: 25.88 KG/M2 | WEIGHT: 161 LBS

## 2018-12-20 DIAGNOSIS — E78.5 HYPERLIPIDEMIA, UNSPECIFIED HYPERLIPIDEMIA TYPE: ICD-10-CM

## 2018-12-20 DIAGNOSIS — E03.2 IATROGENIC HYPOTHYROIDISM: ICD-10-CM

## 2018-12-20 DIAGNOSIS — Z72.0 TOBACCO USE: ICD-10-CM

## 2018-12-20 DIAGNOSIS — F32.A DEPRESSION, UNSPECIFIED DEPRESSION TYPE: ICD-10-CM

## 2018-12-20 DIAGNOSIS — J44.9 CHRONIC OBSTRUCTIVE PULMONARY DISEASE, UNSPECIFIED COPD TYPE (HCC): ICD-10-CM

## 2018-12-20 PROBLEM — F03.90 DEMENTIA (HCC): Status: RESOLVED | Noted: 2018-02-07 | Resolved: 2018-12-20

## 2018-12-20 PROCEDURE — 99214 OFFICE O/P EST MOD 30 MIN: CPT | Mod: GC | Performed by: INTERNAL MEDICINE

## 2018-12-20 RX ORDER — LEVOTHYROXINE SODIUM 175 UG/1
TABLET ORAL
Qty: 90 TAB | Refills: 3 | Status: SHIPPED | OUTPATIENT
Start: 2018-12-20

## 2018-12-20 RX ORDER — TIOTROPIUM BROMIDE 18 UG/1
18 CAPSULE ORAL; RESPIRATORY (INHALATION) DAILY
Qty: 90 CAP | Refills: 3 | Status: SHIPPED | OUTPATIENT
Start: 2018-12-20 | End: 2022-04-16

## 2018-12-20 RX ORDER — SIMVASTATIN 40 MG
40 TABLET ORAL
Qty: 90 TAB | Refills: 3 | Status: SHIPPED | OUTPATIENT
Start: 2018-12-20 | End: 2022-04-16

## 2019-01-12 LAB
ALBUMIN SERPL-MCNC: 4.5 G/DL (ref 3.6–4.8)
ALBUMIN/GLOB SERPL: 1.7 {RATIO} (ref 1.2–2.2)
ALP SERPL-CCNC: 94 IU/L (ref 39–117)
ALT SERPL-CCNC: 7 IU/L (ref 0–32)
AST SERPL-CCNC: 17 IU/L (ref 0–40)
BASOPHILS # BLD AUTO: 0 X10E3/UL (ref 0–0.2)
BASOPHILS NFR BLD AUTO: 0 %
BILIRUB SERPL-MCNC: 0.4 MG/DL (ref 0–1.2)
BUN SERPL-MCNC: 13 MG/DL (ref 8–27)
BUN/CREAT SERPL: 13 (ref 12–28)
CALCIUM SERPL-MCNC: 9.8 MG/DL (ref 8.7–10.3)
CHLORIDE SERPL-SCNC: 101 MMOL/L (ref 96–106)
CHOLEST SERPL-MCNC: 146 MG/DL (ref 100–199)
CO2 SERPL-SCNC: 22 MMOL/L (ref 20–29)
CREAT SERPL-MCNC: 0.98 MG/DL (ref 0.57–1)
EOSINOPHIL # BLD AUTO: 0.1 X10E3/UL (ref 0–0.4)
EOSINOPHIL NFR BLD AUTO: 1 %
ERYTHROCYTE [DISTWIDTH] IN BLOOD BY AUTOMATED COUNT: 13 % (ref 12.3–15.4)
GLOBULIN SER CALC-MCNC: 2.7 G/DL (ref 1.5–4.5)
GLUCOSE SERPL-MCNC: 81 MG/DL (ref 65–99)
HCT VFR BLD AUTO: 49 % (ref 34–46.6)
HDLC SERPL-MCNC: 43 MG/DL
HGB BLD-MCNC: 16.1 G/DL (ref 11.1–15.9)
IF AFRICAN AMERICAN  100797: 71 ML/MIN/1.73
IF NON AFRICAN AMER 100791: 62 ML/MIN/1.73
IMM GRANULOCYTES # BLD AUTO: 0 X10E3/UL (ref 0–0.1)
IMM GRANULOCYTES NFR BLD AUTO: 0 %
IMMATURE CELLS  115398: ABNORMAL
LABORATORY COMMENT REPORT: NORMAL
LDLC SERPL CALC-MCNC: 76 MG/DL (ref 0–99)
LYMPHOCYTES # BLD AUTO: 2.6 X10E3/UL (ref 0.7–3.1)
LYMPHOCYTES NFR BLD AUTO: 22 %
MCH RBC QN AUTO: 29.4 PG (ref 26.6–33)
MCHC RBC AUTO-ENTMCNC: 32.9 G/DL (ref 31.5–35.7)
MCV RBC AUTO: 89 FL (ref 79–97)
MONOCYTES # BLD AUTO: 0.7 X10E3/UL (ref 0.1–0.9)
MONOCYTES NFR BLD AUTO: 6 %
MORPHOLOGY BLD-IMP: ABNORMAL
NEUTROPHILS # BLD AUTO: 8.6 X10E3/UL (ref 1.4–7)
NEUTROPHILS NFR BLD AUTO: 71 %
NRBC BLD AUTO-RTO: ABNORMAL %
PLATELET # BLD AUTO: 187 X10E3/UL (ref 150–379)
POTASSIUM SERPL-SCNC: 4.8 MMOL/L (ref 3.5–5.2)
PROT SERPL-MCNC: 7.2 G/DL (ref 6–8.5)
RBC # BLD AUTO: 5.48 X10E6/UL (ref 3.77–5.28)
SODIUM SERPL-SCNC: 138 MMOL/L (ref 134–144)
TRIGL SERPL-MCNC: 134 MG/DL (ref 0–149)
TSH SERPL DL<=0.005 MIU/L-ACNC: 0.48 UIU/ML (ref 0.45–4.5)
VLDLC SERPL CALC-MCNC: 27 MG/DL (ref 5–40)
WBC # BLD AUTO: 12.1 X10E3/UL (ref 3.4–10.8)

## 2021-08-31 ENCOUNTER — HOSPITAL ENCOUNTER (OUTPATIENT)
Dept: LAB | Facility: MEDICAL CENTER | Age: 66
End: 2021-08-31
Attending: INTERNAL MEDICINE
Payer: MEDICAID

## 2021-08-31 LAB
T4 FREE SERPL-MCNC: 2.4 NG/DL (ref 0.93–1.7)
TSH SERPL DL<=0.005 MIU/L-ACNC: 0.33 UIU/ML (ref 0.38–5.33)

## 2021-08-31 PROCEDURE — 86800 THYROGLOBULIN ANTIBODY: CPT

## 2021-08-31 PROCEDURE — 84443 ASSAY THYROID STIM HORMONE: CPT

## 2021-08-31 PROCEDURE — 36415 COLL VENOUS BLD VENIPUNCTURE: CPT

## 2021-08-31 PROCEDURE — 84432 ASSAY OF THYROGLOBULIN: CPT

## 2021-08-31 PROCEDURE — 84439 ASSAY OF FREE THYROXINE: CPT

## 2021-09-02 LAB
THYROGLOB AB SERPL-ACNC: <0.9 IU/ML (ref 0–4)
THYROGLOB SERPL-MCNC: <0.1 NG/ML (ref 1.3–31.8)
THYROGLOB SERPL-MCNC: ABNORMAL NG/ML (ref 1.3–31.8)

## 2022-01-10 ENCOUNTER — OFFICE VISIT (OUTPATIENT)
Dept: MEDICAL GROUP | Facility: CLINIC | Age: 67
End: 2022-01-10
Payer: MEDICAID

## 2022-01-10 VITALS
SYSTOLIC BLOOD PRESSURE: 127 MMHG | HEIGHT: 67 IN | BODY MASS INDEX: 24.96 KG/M2 | WEIGHT: 159 LBS | RESPIRATION RATE: 16 BRPM | TEMPERATURE: 98.1 F | OXYGEN SATURATION: 95 % | DIASTOLIC BLOOD PRESSURE: 85 MMHG | HEART RATE: 86 BPM

## 2022-01-10 DIAGNOSIS — D17.0 LIPOMA OF HEAD: ICD-10-CM

## 2022-01-10 DIAGNOSIS — Z85.850 HX OF THYROID CANCER: ICD-10-CM

## 2022-01-10 DIAGNOSIS — E03.2 IATROGENIC HYPOTHYROIDISM: ICD-10-CM

## 2022-01-10 DIAGNOSIS — E03.9 HYPOTHYROIDISM (ACQUIRED): ICD-10-CM

## 2022-01-10 PROCEDURE — 99213 OFFICE O/P EST LOW 20 MIN: CPT | Mod: GE | Performed by: STUDENT IN AN ORGANIZED HEALTH CARE EDUCATION/TRAINING PROGRAM

## 2022-01-10 NOTE — PROGRESS NOTES
"Subjective:     CC: lump on head    HPI:   Chastity presents today with a lump on her right side of her head.    Not growing, not painful, no drainage. No lumps in the past. She reports it does not bother her, but she wanted to get it looked at/     Hx of thyroid cancer. Removed thyorid in 2006. Now on thyroid medication, was seeing Dr. Jacinto at [facility]. Needs a referral to a new endocrinologist. On 175 of levothyroxine.  Wasn't supposed to follow up until September.       Current Outpatient Medications Ordered in Epic   Medication Sig Dispense Refill   • levothyroxine (SYNTHROID) 175 MCG Tab TAKE 1 TABLET BY MOUTH ONCE DAILY IN THE MORNING ON AN EMPTY STOMACH 90 Tab 3   • tiotropium (SPIRIVA) 18 MCG Cap Inhale 1 Cap by mouth every day. 90 Cap 3   • simvastatin (ZOCOR) 40 MG Tab Take 1 Tab by mouth every day. N THE EVENING 90 Tab 3   • citalopram (CELEXA) 40 MG Tab      • olanzapine (ZYPREXA) 10 MG tablet 10 mg.  4   • albuterol (PROVENTIL) 2.5mg/3ml Nebu Soln solution for nebulization 3 mL by Nebulization route every four hours as needed for Shortness of Breath. (Patient not taking: Reported on 12/20/2018) 75 mL 3     No current [facility]-ordered facility-administered medications on file.           ROS:  Gen: no fevers/chills, no changes in weight  Pulm: no sob, no cough      Objective:     Exam:  /85 (BP Location: Right arm, Patient Position: Sitting, BP Cuff Size: Adult)   Pulse 86   Temp 36.7 °C (98.1 °F) (Tympanic)   Resp 16   Ht 1.702 m (5' 7\")   Wt 72.1 kg (159 lb)   SpO2 95%   BMI 24.90 kg/m²  Body mass index is 24.9 kg/m².    Gen: Alert and oriented, No apparent distress.  Neck: Neck is supple without lymphadenopathy.  Lungs: Normal effort, CTA bilaterally, no wheezes, rhonchi, or rales  CV: Regular rate and rhythm. No murmurs, rubs, or gallops.  Ext: No clubbing, cyanosis, edema.  Skin: 2x2cm mass on the right side of head behind the hair line. Soft, mobile, no skin " changes.        Assessment & Plan:     66 y.o. female with the following -     Problem List Items Addressed This Visit     Hx of thyroid cancer    Relevant Orders    Referral to Endocrinology    Iatrogenic hypothyroidism     Patient with thyroid cancer, thryoid removed in 2006.  Was seeing Dr. Jacinto, I will refer to a new endocrinologist.          Lipoma of head     Patient with 2x2cm lipoma of the head. This is located on the right side of her head just behind the hair line at the level just above her eyebrow. She reports it has been there 1 year and has not grown. It is not bothering her. No drainage or discoloration of the area.  It is soft and mobile on exam.  Likely a lipoma. Reassurance provided. Offered removal, but patient reports it is not painful or bothering her.   Will watch a wait. If growing, we can consider an ultrasound.            Other Visit Diagnoses     Hypothyroidism (acquired)        Relevant Orders    Referral to Endocrinology        Please note that this dictation was created using voice recognition software. I have made every reasonable attempt to correct obvious errors, but I expect that there are errors of grammar and possibly content that I did not discover before finalizing the note.

## 2022-04-16 ENCOUNTER — HOSPITAL ENCOUNTER (EMERGENCY)
Facility: MEDICAL CENTER | Age: 67
End: 2022-04-16
Attending: EMERGENCY MEDICINE
Payer: MEDICAID

## 2022-04-16 VITALS
WEIGHT: 154.76 LBS | HEIGHT: 66 IN | RESPIRATION RATE: 16 BRPM | DIASTOLIC BLOOD PRESSURE: 64 MMHG | OXYGEN SATURATION: 96 % | BODY MASS INDEX: 24.87 KG/M2 | TEMPERATURE: 97.7 F | SYSTOLIC BLOOD PRESSURE: 108 MMHG | HEART RATE: 84 BPM

## 2022-04-16 DIAGNOSIS — R45.89 HOPELESSNESS: ICD-10-CM

## 2022-04-16 DIAGNOSIS — F33.1 MODERATE EPISODE OF RECURRENT MAJOR DEPRESSIVE DISORDER (HCC): ICD-10-CM

## 2022-04-16 DIAGNOSIS — F32.0 CURRENT MILD EPISODE OF MAJOR DEPRESSIVE DISORDER WITHOUT PRIOR EPISODE (HCC): ICD-10-CM

## 2022-04-16 LAB
AMPHET UR QL SCN: NEGATIVE
BARBITURATES UR QL SCN: NEGATIVE
BENZODIAZ UR QL SCN: NEGATIVE
BZE UR QL SCN: NEGATIVE
CANNABINOIDS UR QL SCN: NEGATIVE
METHADONE UR QL SCN: NEGATIVE
OPIATES UR QL SCN: NEGATIVE
OXYCODONE UR QL SCN: NEGATIVE
PCP UR QL SCN: NEGATIVE
POC BREATHALIZER: 0 PERCENT (ref 0–0.01)
PROPOXYPH UR QL SCN: NEGATIVE

## 2022-04-16 PROCEDURE — 90791 PSYCH DIAGNOSTIC EVALUATION: CPT

## 2022-04-16 PROCEDURE — 99285 EMERGENCY DEPT VISIT HI MDM: CPT

## 2022-04-16 PROCEDURE — 80307 DRUG TEST PRSMV CHEM ANLYZR: CPT

## 2022-04-16 PROCEDURE — 302970 POC BREATHALIZER: Performed by: EMERGENCY MEDICINE

## 2022-04-16 RX ORDER — LORAZEPAM 2 MG/1
2 TABLET ORAL ONCE
Status: DISCONTINUED | OUTPATIENT
Start: 2022-04-16 | End: 2022-04-16

## 2022-04-16 RX ORDER — MEMANTINE HYDROCHLORIDE 10 MG/1
10 TABLET ORAL 2 TIMES DAILY
COMMUNITY

## 2022-04-16 RX ORDER — BENZTROPINE MESYLATE 0.5 MG/1
0.5 TABLET ORAL 2 TIMES DAILY
Status: SHIPPED | COMMUNITY
End: 2024-02-03

## 2022-04-16 ASSESSMENT — LIFESTYLE VARIABLES: DO YOU DRINK ALCOHOL: YES

## 2022-04-16 NOTE — ED NOTES
Pt tearful, calm and cooperative. States still having suicidal feelings, denies HI. States she has racing thoughts and having difficulty sleeping.

## 2022-04-16 NOTE — ED TRIAGE NOTES
Chief Complaint   Patient presents with   • Suicidal Ideation     Patient brought in by family for suicidal ideation. She has been more depressed recently and doesn't feel a purpose to live anymore. She denies a plan. Denies etoh or drugs

## 2022-04-16 NOTE — ED NOTES
Med rec updated and complete. Allergies reviewed reviewed.  Confirmed name and date of birth.  Interviewed pt at bedside. Confirmed medications and last doses taken. Confirmed  Prescriptions in pharmacy with pharmacist present.    [place] [facility] [phone]

## 2022-04-16 NOTE — ED PROVIDER NOTES
ED Provider Note    CHIEF COMPLAINT  Chief Complaint   Patient presents with   • Suicidal Ideation     Patient brought in by family for suicidal ideation. She has been more depressed recently and doesn't feel a purpose to live anymore. She denies a plan. Denies etoh or drugs       HPI  Chastity Blanco is a 66 y.o. female who presents via triage for psychiatric evaluation, brought in by her daughter-in-law.  She describes a feeling of hopelessness, she states that she just does not want to live and if left alone she would kill herself.  The patient reports a history of suicide attempt, she has bipolar disorder but is compliant with her medications.  Smokes cigarettes, denies any alcohol or drug use.  She has no chest pain, abdominal pain, back pain, focal weakness, numbness or tingling, no headache, no other specific complaints.    REVIEW OF SYSTEMS  Negative for fever, rash, chest pain, dyspnea, abdominal pain, nausea, vomiting, diarrhea, headache, focal weakness, focal numbness, focal tingling, back pain. All other systems are negative.     PAST MEDICAL HISTORY  Past Medical History:   Diagnosis Date   • Bipolar disorder ([facility]) 6/16/2016   • COPD (chronic obstructive pulmonary disease) ([facility]) 6/16/2016   • Depression 6/16/2016   • Hx of thyroid cancer 6/16/2016   • Hyperlipidemia 6/16/2016   • Thyroid disorder removed   • Tobacco abuse 6/16/2016   • Vitamin D deficiency 6/16/2016       FAMILY HISTORY  Family History   Problem Relation Age of Onset   • Cancer Mother         Breast       SOCIAL HISTORY  Social History     Tobacco Use   • Smoking status: Current Every Day Smoker     Packs/day: 0.50     Years: 40.00     Pack years: 20.00     Types: Cigarettes   • Smokeless tobacco: Never Used   • Tobacco comment: recently restarted smoking   Substance Use Topics   • Alcohol use: Yes     Comment: twice in past week   • Drug use: No       SURGICAL HISTORY  Past Surgical History:   Procedure Laterality Date   •  "THYROIDECTOMY  2006       CURRENT MEDICATIONS  I personally reviewed the medication list in the charting documentation.     ALLERGIES  No Known Allergies    MEDICAL RECORD  I have reviewed patient's medical record and pertinent results are listed above.      PHYSICAL EXAM  VITAL SIGNS: /71   Pulse 91   Temp 36.6 °C (97.8 °F) (Temporal)   Resp 16   Ht 1.676 m (5' 6\")   Wt 70.2 kg (154 lb 12.2 oz)   SpO2 97%   BMI 24.98 kg/m²    Constitutional: Well appearing patient in no acute distress.  Awake and alert, not toxic nor ill in appearance.  HENT: Normocephalic, no obvious evidence of acute trauma.  Eyes: No scleral icterus. Normal conjunctiva   Neck: Comfortable movement without any obvious restriction in the range of motion.  Cardiovascular: Upon ascultation I appreciate a regular heart rhythm and a normal rate.   Thorax & Lungs: Normal nonlabored respirations.  Upon application of the stethoscope for auscultation I find there to be no associated chest wall tenderness.  I appreciate no wheezing, rhonchi or rales. There is normal air movement.    Abdomen: The abdomen is not visibly distended. Upon palpation, I find it to be without tenderness.  No mass appreciated.  Skin: The exposed portions of skin reveal no obvious rash or other abnormalities.  Extremities/Musculoskeletal: No obvious sign of acute trauma. No asymmetric calf tenderness or edema.   Neurologic: Alert & oriented. No focal deficits observed.   Psychiatric: Very flat affect    DIAGNOSTIC STUDIES / PROCEDURES    LABS/EKGs  Results for orders placed or performed during the hospital encounter of 04/16/22   Urine Drug Screen   Result Value Ref Range    Amphetamines Urine Negative Negative    Barbiturates Negative Negative    Benzodiazepines Negative Negative    Cocaine Metabolite Negative Negative    Methadone Negative Negative    Opiates Negative Negative    Oxycodone Negative Negative    Phencyclidine -Pcp Negative Negative    Propoxyphene " Negative Negative    Cannabinoid Metab Negative Negative   POC BREATHALIZER   Result Value Ref Range    POC Breathalizer 0.000 0.00 - 0.01 Percent        COURSE & MEDICAL DECISION MAKING  I have reviewed any medical record information, laboratory studies and radiographic results as noted above.    Encounter Summary: This is a very pleasant 66 y.o. female who unfortunately required evaluation in the emergency department today with suicidal ideation, really has no other complaints whatsoever, a very flat affect on exam, does have a psychiatric history including suicide attempt.  No physical complaints.  No focal physical findings on exam.  Will undergo psychiatric evaluation ------ at the time of this dictation the psychiatric evaluation has not yet taken place.  She has been resting comfortably.  She will be signed out to the overnight physician pending psychiatric evaluation for disposition    DISPOSITION: Pending psychiatric evaluation      FINAL IMPRESSION  1. Moderate episode of recurrent major depressive disorder (HCC)    2. Hopelessness           This dictation was created using voice recognition software. The accuracy of the dictation is limited to the abilities of the software. I expect there may be some errors of grammar and possibly content. The nursing notes were reviewed and certain aspects of this information were incorporated into this note.    Electronically signed by: Bernard Villalobos M.D., 4/16/2022 12:38 PM

## 2022-04-17 NOTE — ED PROVIDER NOTES
ED Provider Note    9:21 PM  The pt has been seen by avel Goodman.  The pt has contracted for safety, she has an outpatient psychiatrist for treatment.  She has no current si/hi.  She will be picked up by her family, and they are aware of the plan.

## 2022-04-17 NOTE — ED NOTES
Discharge instructions given and explained to pt including f/u. All questions answered and pt verbalized understanding. All belongings returned to pt including medications. Pt ambulatory to ER lobby to meet family.

## 2022-04-17 NOTE — CONSULTS
"[facility]   TRIAGE ASSESSMENT    Name: Chastity Blanco  MRN: [id number]  : 1955  Age: 66 y.o.  Date of assessment: 2022  PCP: Sal Chaparro M.D.  Persons in attendance: Patient  Patient Location: [facility]    CHIEF COMPLAINT/PRESENTING ISSUE (as stated by patient): 66 year old female BIB sister-in law d/t hopelessness and stating \"I want to be dead\" but no active plan or intent; legal hold; pt alert, oriented to person, place, disoriented to date and with short term memory recall deficits; \"forgot\" the questions asked several times during the evaluation; calm; cooperative; pleasant; with organized thoughts and behaviors; no delusions, paranoia, hallucinations noted; perseveration about not being the person she \"used to be\"; states her mother  last 10/2021 and she is not as active as she sued to be; repeating \"I don't know to many questions asked r/t what she enjoys doing, what help she needs in the emergency room;  insight, judgment adequate; currently denies SI, HI, or self-harm ideation; future-oriented; current outpt MH provider includes psychiatristElyssa, with last appt 1 month ago, next appt 2022; current psych meds include Zyprexa 10 mg PO briggs and Celexa 40 mg PO daily, states taking as prescribed; states h/o inpt MH tx at [facility] 2019; with noted h/o psych diagnoses including Bipolar D/O and Depression; pt receives $897 month SSD; lives with her brother, sister-in-law and nephew; pt states her son takes her shopping every other Saturday and she looks forward to this    Writer RN spoke with pt's sister-in-law, Lucía, [phone], who states pt has been more isolative since her mother  but pt does visit with family and go shopping; writer RN reviewed community MH resources with pt and sister-in-law and will send pt referral to [facility] with recommendation pt f/u next week to schedule a therapy appt; encouraged pt to cont to f/u with her " "current outpt psychiatrist and take medications as prescribed; Lucía mentioned a senior citizen program close to their residence and writer RN encouraged pt f/u there, also; pt and Lucía verbalized understanding; Lucía denies safety concerns with pt returning home tonight      Chief Complaint   Patient presents with   • Suicidal Ideation     Patient brought in by family for suicidal ideation. She has been more depressed recently and doesn't feel a purpose to live anymore. She denies a plan. Denies etoh or drugs        CURRENT LIVING SITUATION/SOCIAL SUPPORT/FINANCIAL RESOURCES:  lives with her brother, sister-in-law and nephew; pt states her son takes her shopping every other Saturday and she looks forward to this      BEHAVIORAL HEALTH/SUBSTANCE USE TREATMENT HISTORY  Does patient/parent report a history of prior behavioral health/substance use treatment for patient?   Yes:    Dates Level of Care Facilty/Provider Diagnosis/Problem Medications   currently outpt  psychiatristElyssa, with last appt 1 month ago, next appt 6/2022 Bipolar d/o, depression Zyprexa 10 mg PO briggs and Celexa 40 mg PO daily   2019 inpt [facility] Bipolar d/o         SAFETY ASSESSMENT - SELF  Does patient acknowledge current or past symptoms of dangerousness to self or is previous history noted? Yes- stating \"I want to be dead\"; h/o SA years ago  Does parent/significant other report patient has current or past symptoms of dangerousness to self? N\A  Does presenting problem suggest symptoms of dangerousness to self? Yes:     Past Current    Suicidal Thoughts: [x]  []    Suicidal Plans: []  []    Suicidal Intent: []  []    Suicide Attempts: [x]  []    Self-Injury []  []      For any boxes checked above, provide detail: stating \"I want to be dead\"; h/o SA years ago      History of suicide by family member: no  History of suicide by friend/significant other: no  Recent change in frequency/specificity/intensity of suicidal thoughts or self-harm " behavior? no  Current access to firearms, medications, or other identified means of suicide/self-harm? no  If yes, willing to restrict access to means of suicide/self-harm? yes - no guns or weapons  Protective factors present:  Fear of suicide, Future-oriented, Positive self-efficacy, Strong family connections, Actively engaged in treatment and Willing to address in treatment    SAFETY ASSESSMENT - OTHERS  Does patient acknowledge current or past symptoms of aggressive behavior or risk to others or is previous history noted? no  Does parent/significant other report patient has current or past symptoms of aggressive behavior or risk to others?  N\A  Does presenting problem suggest symptoms of dangerousness to others? No    LEGAL HISTORY  Does patient acknowledge history of arrest/alf/[facility] or is previous history noted? no    Crisis Safety Plan completed and copy given to patient? No-pt DC'd prior to completing    ABUSE/NEGLECT SCREENING  Does patient report feeling “unsafe” in his/her home, or afraid of anyone?  no  Does patient report any history of physical, sexual, or emotional abuse?  no  Does parent or significant other report any of the above? N\A  Is there evidence of neglect by self?  no  Is there evidence of neglect by a caregiver? no  Does the patient/parent report any history of CPS/APS/police involvement related to suspected abuse/neglect or domestic violence? no  Based on the information provided during the current assessment, is a mandated report of suspected abuse/neglect being made?  No    SUBSTANCE USE SCREENING  Pt denies substance use    UDS negative  ETOH negative      MENTAL STATUS   Participation: Active verbal participation, Engaged and Open to feedback  Grooming: Casual and Neat  Orientation: Alert and Disoriented to: date, some recent events  Behavior: Calm  Eye contact: Good  Mood: Anxious  Affect: Blunted and Flat  Thought process: Logical, Goal-directed, Circumstantial and  Perseveration  Thought content: Within normal limits  Speech: Rate within normal limits and Volume within normal limits  Perception: Within normal limits  Memory:  Poor memory for chronology of events  Insight: Adequate  Judgment:  Adequate  Other:    Collateral information:   Source:  [] Significant other present in person:   [] Significant other by telephone  [] [facility]   [x] [facility] Nursing Staff  [x] [facility]  [x] Other: sister-in-lawLucía, [phone]    [] Unable to complete full assessment due to:  [] Acute intoxication  [] Patient declined to participate/engage  [] Patient verbally unresponsive  [] Significant cognitive deficits  [] Significant perceptual distortions or behavioral disorganization  [] Other:      CLINICAL IMPRESSIONS:  Primary:  Depression by history  Secondary:         IDENTIFIED NEEDS/PLAN:  [Trigger DISPOSITION list for any items marked]    []  Imminent safety risk - self [] Imminent safety risk - others   []  Acute substance withdrawal []  Psychosis/Impaired reality testing   [x]  Mood/anxiety []  Substance use/Addictive behavior   []  Maladaptive behaviro []  Parent/child conflict   []  Family/Couples conflict []  Biomedical   []  Housing []  Financial   []   Legal  Occupational/Educational   []  Domestic violence []  Other:     Recommended Plan of Care:  Refer to [facility], [facility] [place]; Medicaid FFS insurance plan; Writer RN spoke with pt's sister-in-law, Lucía, [phone], who states pt has been more isolative since her mother  but pt does visit with family and go shopping; writer RN reviewed community  resources with pt and sister-in-law and will send pt referral to [facility] [place] with recommendation pt f/u next week to schedule a therapy appt; encouraged pt to cont to f/u with her current outpt psychiatrist and take medications as prescribed; Lucía mentioned a senior citizen program close to their residence and writer RN encouraged pt f/u there, also;  encouraged pt to apply for Medicare benefits as pt is 66; pt and Lucía verbalized understanding; pt to DC to home tonight with transport by family      Has the Recommended Plan of Care/Level of Observation been reviewed with the patient's assigned nurse? yes    Does patient/parent or guardian express agreement with the above plan? yes      Referral appointment(s) scheduled? no    Alert team only:   I have discussed findings and recommendations with Dr. Taylor who is in agreement with these recommendations. Legal hold DC'd    Ref[facility] information [facility] the [facility]  Referral information sent to the following inpatient community providers :NA    If applicable : Referred  to  Alert Team for legal hold follow up at (time): VERONICA Hendrickson R.N.  4/16/2022

## 2022-06-27 NOTE — ASSESSMENT & PLAN NOTE
Patient with 2x2cm lipoma of the head. This is located on the right side of her head just behind the hair line at the level just above her eyebrow. She reports it has been there 1 year and has not grown. It is not bothering her. No drainage or discoloration of the area.  It is soft and mobile on exam.  Likely a lipoma. Reassurance provided. Offered removal, but patient reports it is not painful or bothering her.   Will watch a wait. If growing, we can consider an ultrasound.   
Patient with thyroid cancer, thryoid removed in 2006.  Was seeing Dr. Jacinto, I will refer to a new endocrinologist.   
constipation

## 2022-07-09 ENCOUNTER — APPOINTMENT (OUTPATIENT)
Dept: RADIOLOGY | Facility: IMAGING CENTER | Age: 67
End: 2022-07-09
Attending: NURSE PRACTITIONER
Payer: MEDICAID

## 2022-07-09 ENCOUNTER — OFFICE VISIT (OUTPATIENT)
Dept: URGENT CARE | Facility: CLINIC | Age: 67
End: 2022-07-09
Payer: MEDICAID

## 2022-07-09 VITALS
HEIGHT: 67 IN | TEMPERATURE: 98.8 F | BODY MASS INDEX: 23.7 KG/M2 | WEIGHT: 151 LBS | DIASTOLIC BLOOD PRESSURE: 80 MMHG | SYSTOLIC BLOOD PRESSURE: 120 MMHG | HEART RATE: 95 BPM | OXYGEN SATURATION: 97 % | RESPIRATION RATE: 14 BRPM

## 2022-07-09 DIAGNOSIS — R51.9 NONINTRACTABLE HEADACHE, UNSPECIFIED CHRONICITY PATTERN, UNSPECIFIED HEADACHE TYPE: ICD-10-CM

## 2022-07-09 DIAGNOSIS — M25.551 RIGHT HIP PAIN: ICD-10-CM

## 2022-07-09 DIAGNOSIS — M16.0 OSTEOARTHRITIS OF BOTH HIPS, UNSPECIFIED OSTEOARTHRITIS TYPE: ICD-10-CM

## 2022-07-09 PROCEDURE — 99204 OFFICE O/P NEW MOD 45 MIN: CPT | Performed by: NURSE PRACTITIONER

## 2022-07-09 PROCEDURE — 73501 X-RAY EXAM HIP UNI 1 VIEW: CPT | Mod: TC,RT | Performed by: NURSE PRACTITIONER

## 2022-07-09 PROCEDURE — 70250 X-RAY EXAM OF SKULL: CPT | Mod: TC | Performed by: NURSE PRACTITIONER

## 2022-07-09 RX ORDER — MELOXICAM 15 MG/1
15 TABLET ORAL DAILY
Qty: 30 TABLET | Refills: 0 | Status: SHIPPED | OUTPATIENT
Start: 2022-07-09 | End: 2024-02-03

## 2022-07-09 ASSESSMENT — ENCOUNTER SYMPTOMS
JOINT SWELLING: 0
WEAKNESS: 0
SORE THROAT: 0
MYALGIAS: 0
VOMITING: 0
CHILLS: 0
NUMBNESS: 0
FEVER: 0
HIP PAIN: 1
EYE PAIN: 0
NAUSEA: 0
DIZZINESS: 0
SHORTNESS OF BREATH: 0

## 2022-07-09 NOTE — PROGRESS NOTES
"Subjective:   Chastity Blanco is a 66 y.o. female who presents for Hip Pain (X 1 MONTH, PAIN IN RIGHT HIP WHEN WALKING, SITTING, OR ANY MOVEMENT)      Hip Pain  This is a new problem. The current episode started more than 1 month ago (denies injury ). The problem occurs constantly. The problem has been gradually worsening. Pertinent negatives include no chest pain, chills, fever, joint swelling, myalgias, nausea, numbness, rash, sore throat, vomiting or weakness. The symptoms are aggravated by walking, twisting and bending. She has tried nothing for the symptoms. The treatment provided no relief.   Patient also notes a\" crease on her right side of her scalp\" that has been present for the past 2 months which is intermittently discomforting.  She denies any head trauma or injury.  Denies headaches.  Has not changed in nature or severity.    Review of Systems   Constitutional: Negative for chills and fever.   HENT: Negative for sore throat.         Scalp pain   Eyes: Negative for pain.   Respiratory: Negative for shortness of breath.    Cardiovascular: Negative for chest pain.   Gastrointestinal: Negative for nausea and vomiting.   Genitourinary: Negative for hematuria.   Musculoskeletal: Positive for joint pain. Negative for joint swelling and myalgias.   Skin: Negative for rash.   Neurological: Negative for dizziness, weakness and numbness.       Medications:    • benztropine Tabs  • levothyroxine Tabs  • memantine Tabs    Allergies: Patient has no known allergies.    Problem List: Chastity Blanco does not have any pertinent problems on file.    Surgical History:  Past Surgical History:   Procedure Laterality Date   • THYROIDECTOMY  2006       Past Social Hx: Chastity Blanco  reports that she has been smoking cigarettes. She has a 20.00 pack-year smoking history. She has never used smokeless tobacco. She reports current alcohol use. She reports that she does not use drugs.     Past Family Hx:  Chastity" "Maddie Blanco family history includes Cancer in her mother.     Problem list, medications, and allergies reviewed by myself today in Epic.     Objective:     /80 (BP Location: Right arm, Patient Position: Sitting, BP Cuff Size: Adult)   Pulse 95   Temp 37.1 °C (98.8 °F) (Temporal)   Resp 14   Ht 1.702 m (5' 7\")   Wt 68.5 kg (151 lb)   SpO2 97%   BMI 23.65 kg/m²     Physical Exam  Constitutional:       Appearance: Normal appearance. She is not ill-appearing or toxic-appearing.   HENT:      Head: Normocephalic.        Right Ear: External ear normal.      Left Ear: External ear normal.      Nose: Nose normal.      Mouth/Throat:      Lips: Pink.      Mouth: Mucous membranes are moist.   Eyes:      General: Lids are normal.         Right eye: No discharge.         Left eye: No discharge.   Pulmonary:      Effort: Pulmonary effort is normal. No accessory muscle usage or respiratory distress.   Musculoskeletal:      Cervical back: Full passive range of motion without pain.      Right hip: Tenderness present. No deformity, bony tenderness or crepitus. Decreased range of motion. Normal strength.        Legs:       Comments: Pain elicited with internal and external rotation   Skin:     Coloration: Skin is not pale.   Neurological:      Mental Status: She is alert and oriented to person, place, and time.   Psychiatric:         Mood and Affect: Mood normal.         Thought Content: Thought content normal.         Assessment/Plan:     Diagnosis and associated orders:     1. Right hip pain  DX-HIP-UNILATERAL-WITH PELVIS-1 VIEW RIGHT    Referral to Orthopedics   2. Osteoarthritis of both hips, unspecified osteoarthritis type  DX-HIP-UNILATERAL-WITH PELVIS-1 VIEW RIGHT    Referral to Orthopedics    meloxicam (MOBIC) 15 MG tablet   3. Nonintractable headache, unspecified chronicity pattern, unspecified headache type  DX-SKULL-LIMITED 3-    CANCELED: DX-SKULL-COMPLETE 4+        Comments/MDM:     I independently reviewed " the patient's imaging and agree with the interpretation of the radiologist.         1.  No RIGHT hip or pelvic fracture.  2.  Moderate degenerative change of both hips, worse on the RIGHT.    Unremarkable skull series.    X-ray negative for any fracture or dislocation, degenerative changes noted greater on the right hip versus the left encouraged rest, ice, heat, will trial anti-inflammatory.  Patient will follow-up with orthopedics for further valuation management.  Discussed watchful monitoring of scalp patient will follow-up with her PCP for further evaluation management if sx persist  I personally reviewed prior external notes and prior test results pertinent to today's visit.   Discussed management options, risks and benefits, and alternatives to treatment plan agreed upon.   Red flags discussed and indications to immediately call 911 or present to the Emergency Department.   Supportive care, differential diagnoses, and indications for immediate follow-up discussed with patient.    Patient expresses understanding and agrees to plan. Patient denies any other questions or concerns.           My total time spent caring for the patient on the day of the encounter was 45 minutes.   This does not include time spent on separately billable procedures/tests.      Please note that this dictation was created using voice recognition software. I have made a reasonable attempt to correct obvious errors, but I expect that there are errors of grammar and possibly content that I did not discover before finalizing the note.    This note was electronically signed by Anselmo GR.

## 2024-02-03 ENCOUNTER — HOSPITAL ENCOUNTER (EMERGENCY)
Facility: MEDICAL CENTER | Age: 69
End: 2024-02-03
Attending: EMERGENCY MEDICINE
Payer: MEDICAID

## 2024-02-03 ENCOUNTER — APPOINTMENT (OUTPATIENT)
Dept: RADIOLOGY | Facility: MEDICAL CENTER | Age: 69
End: 2024-02-03
Attending: EMERGENCY MEDICINE
Payer: MEDICAID

## 2024-02-03 VITALS
WEIGHT: 116.4 LBS | DIASTOLIC BLOOD PRESSURE: 73 MMHG | BODY MASS INDEX: 18.71 KG/M2 | HEART RATE: 99 BPM | SYSTOLIC BLOOD PRESSURE: 116 MMHG | RESPIRATION RATE: 20 BRPM | TEMPERATURE: 97.9 F | OXYGEN SATURATION: 97 % | HEIGHT: 66 IN

## 2024-02-03 DIAGNOSIS — N39.0 ACUTE UTI: ICD-10-CM

## 2024-02-03 DIAGNOSIS — F03.A0 MILD DEMENTIA WITHOUT BEHAVIORAL DISTURBANCE, PSYCHOTIC DISTURBANCE, MOOD DISTURBANCE, OR ANXIETY, UNSPECIFIED DEMENTIA TYPE (HCC): ICD-10-CM

## 2024-02-03 LAB
ALBUMIN SERPL BCP-MCNC: 4.2 G/DL (ref 3.2–4.9)
ALBUMIN/GLOB SERPL: 1.5 G/DL
ALP SERPL-CCNC: 87 U/L (ref 30–99)
ALT SERPL-CCNC: 8 U/L (ref 2–50)
AMPHET UR QL SCN: NEGATIVE
ANION GAP SERPL CALC-SCNC: 16 MMOL/L (ref 7–16)
APPEARANCE UR: ABNORMAL
AST SERPL-CCNC: 10 U/L (ref 12–45)
BACTERIA #/AREA URNS HPF: ABNORMAL /HPF
BARBITURATES UR QL SCN: NEGATIVE
BASOPHILS # BLD AUTO: 0.1 % (ref 0–1.8)
BASOPHILS # BLD: 0.01 K/UL (ref 0–0.12)
BENZODIAZ UR QL SCN: NEGATIVE
BILIRUB SERPL-MCNC: 0.5 MG/DL (ref 0.1–1.5)
BILIRUB UR QL STRIP.AUTO: NEGATIVE
BUN SERPL-MCNC: 16 MG/DL (ref 8–22)
BZE UR QL SCN: NEGATIVE
CALCIUM ALBUM COR SERPL-MCNC: 9.9 MG/DL (ref 8.5–10.5)
CALCIUM SERPL-MCNC: 10.1 MG/DL (ref 8.5–10.5)
CANNABINOIDS UR QL SCN: NEGATIVE
CHLORIDE SERPL-SCNC: 105 MMOL/L (ref 96–112)
CO2 SERPL-SCNC: 21 MMOL/L (ref 20–33)
COLOR UR: YELLOW
CREAT SERPL-MCNC: 1.43 MG/DL (ref 0.5–1.4)
EOSINOPHIL # BLD AUTO: 0.03 K/UL (ref 0–0.51)
EOSINOPHIL NFR BLD: 0.3 % (ref 0–6.9)
EPI CELLS #/AREA URNS HPF: ABNORMAL /HPF
ERYTHROCYTE [DISTWIDTH] IN BLOOD BY AUTOMATED COUNT: 40.3 FL (ref 35.9–50)
ETHANOL BLD-MCNC: <10.1 MG/DL
FENTANYL UR QL: NEGATIVE
GFR SERPLBLD CREATININE-BSD FMLA CKD-EPI: 40 ML/MIN/1.73 M 2
GLOBULIN SER CALC-MCNC: 2.8 G/DL (ref 1.9–3.5)
GLUCOSE SERPL-MCNC: 103 MG/DL (ref 65–99)
GLUCOSE UR STRIP.AUTO-MCNC: NEGATIVE MG/DL
HCT VFR BLD AUTO: 44 % (ref 37–47)
HGB BLD-MCNC: 14.8 G/DL (ref 12–16)
HYALINE CASTS #/AREA URNS LPF: ABNORMAL /LPF
IMM GRANULOCYTES # BLD AUTO: 0.04 K/UL (ref 0–0.11)
IMM GRANULOCYTES NFR BLD AUTO: 0.4 % (ref 0–0.9)
KETONES UR STRIP.AUTO-MCNC: 15 MG/DL
LEUKOCYTE ESTERASE UR QL STRIP.AUTO: ABNORMAL
LYMPHOCYTES # BLD AUTO: 1.46 K/UL (ref 1–4.8)
LYMPHOCYTES NFR BLD: 16 % (ref 22–41)
MCH RBC QN AUTO: 29.6 PG (ref 27–33)
MCHC RBC AUTO-ENTMCNC: 33.6 G/DL (ref 32.2–35.5)
MCV RBC AUTO: 88 FL (ref 81.4–97.8)
METHADONE UR QL SCN: NEGATIVE
MICRO URNS: ABNORMAL
MONOCYTES # BLD AUTO: 0.53 K/UL (ref 0–0.85)
MONOCYTES NFR BLD AUTO: 5.8 % (ref 0–13.4)
NEUTROPHILS # BLD AUTO: 7.06 K/UL (ref 1.82–7.42)
NEUTROPHILS NFR BLD: 77.4 % (ref 44–72)
NITRITE UR QL STRIP.AUTO: POSITIVE
NRBC # BLD AUTO: 0 K/UL
NRBC BLD-RTO: 0 /100 WBC (ref 0–0.2)
OPIATES UR QL SCN: NEGATIVE
OXYCODONE UR QL SCN: NEGATIVE
PCP UR QL SCN: NEGATIVE
PH UR STRIP.AUTO: 5 [PH] (ref 5–8)
PLATELET # BLD AUTO: 211 K/UL (ref 164–446)
PMV BLD AUTO: 10.9 FL (ref 9–12.9)
POTASSIUM SERPL-SCNC: 4.5 MMOL/L (ref 3.6–5.5)
PROPOXYPH UR QL SCN: NEGATIVE
PROT SERPL-MCNC: 7 G/DL (ref 6–8.2)
PROT UR QL STRIP: NEGATIVE MG/DL
RBC # BLD AUTO: 5 M/UL (ref 4.2–5.4)
RBC # URNS HPF: ABNORMAL /HPF
RBC UR QL AUTO: ABNORMAL
SODIUM SERPL-SCNC: 142 MMOL/L (ref 135–145)
SP GR UR STRIP.AUTO: 1.01
T4 FREE SERPL-MCNC: 3.03 NG/DL (ref 0.93–1.7)
TSH SERPL DL<=0.005 MIU/L-ACNC: 0.17 UIU/ML (ref 0.38–5.33)
UROBILINOGEN UR STRIP.AUTO-MCNC: 0.2 MG/DL
WBC # BLD AUTO: 9.1 K/UL (ref 4.8–10.8)
WBC #/AREA URNS HPF: ABNORMAL /HPF

## 2024-02-03 PROCEDURE — 84439 ASSAY OF FREE THYROXINE: CPT

## 2024-02-03 PROCEDURE — 81001 URINALYSIS AUTO W/SCOPE: CPT | Mod: XU

## 2024-02-03 PROCEDURE — 70450 CT HEAD/BRAIN W/O DYE: CPT

## 2024-02-03 PROCEDURE — 85025 COMPLETE CBC W/AUTO DIFF WBC: CPT

## 2024-02-03 PROCEDURE — 80307 DRUG TEST PRSMV CHEM ANLYZR: CPT

## 2024-02-03 PROCEDURE — 84443 ASSAY THYROID STIM HORMONE: CPT

## 2024-02-03 PROCEDURE — 99284 EMERGENCY DEPT VISIT MOD MDM: CPT | Mod: 25

## 2024-02-03 PROCEDURE — 82077 ASSAY SPEC XCP UR&BREATH IA: CPT

## 2024-02-03 PROCEDURE — 80053 COMPREHEN METABOLIC PANEL: CPT

## 2024-02-03 PROCEDURE — 36415 COLL VENOUS BLD VENIPUNCTURE: CPT

## 2024-02-03 PROCEDURE — 71045 X-RAY EXAM CHEST 1 VIEW: CPT

## 2024-02-03 RX ORDER — SULFAMETHOXAZOLE AND TRIMETHOPRIM 800; 160 MG/1; MG/1
1 TABLET ORAL 2 TIMES DAILY
Qty: 10 TABLET | Refills: 0 | Status: SHIPPED | OUTPATIENT
Start: 2024-02-03 | End: 2024-02-03 | Stop reason: CLARIF

## 2024-02-03 RX ORDER — TRAZODONE HYDROCHLORIDE 50 MG/1
100 TABLET ORAL NIGHTLY
COMMUNITY

## 2024-02-03 RX ORDER — BUPROPION HYDROCHLORIDE 100 MG/1
100 TABLET, EXTENDED RELEASE ORAL 2 TIMES DAILY
COMMUNITY

## 2024-02-03 RX ORDER — OLANZAPINE 5 MG/1
5 TABLET ORAL NIGHTLY
COMMUNITY

## 2024-02-03 RX ORDER — HYDROXYZINE 50 MG/1
75 TABLET, FILM COATED ORAL 2 TIMES DAILY PRN
COMMUNITY

## 2024-02-03 RX ORDER — CEFDINIR 300 MG/1
300 CAPSULE ORAL 2 TIMES DAILY
Qty: 10 CAPSULE | Refills: 0 | Status: ACTIVE | OUTPATIENT
Start: 2024-02-03 | End: 2024-02-08

## 2024-02-03 NOTE — ED NOTES
Med rec updated and complete. Allergies reviewed. Interviewed.  Family ( son) at bedside. Son presented with prescription paper work. Son clarified last doses taken.  No anticoagulant or antiplatelet medications.      Home pharmacy  [facility] = [phone]

## 2024-02-03 NOTE — ED TRIAGE NOTES
"Chief Complaint   Patient presents with    Memory Loss     Pt was diagnosed with dementia 2.5 years ago and has had decline in memory recently. Pt is brought in by son. Son reports that pt went on walk and ended up two miles away from home and couldn't remember how to get home. Pt went to [facility] and was given order for head CT. Son reports pt having dizziness with falls. Pt lives with brother and his wife.          Pt ambulated to triage for above complaint.  Pt is AO x 3, follows commands, and responds appropriately to questions. Patient's breathing is and pain is currently 6/10 on the 0-10 pain scale.  Pt placed in lobby. Patient educated on triage process and encouraged to alert staff for any changes.      /81   Pulse (!) 115   Temp 35.8 °C (96.5 °F) (Temporal)   Resp 18   Ht 1.676 m (5' 6\")   Wt 52.8 kg (116 lb 6.5 oz)   SpO2 99%     "

## 2024-02-03 NOTE — ED PROVIDER NOTES
ED Provider Note    Primary care provider: Sal Chaparro M.D.    CHIEF COMPLAINT  Chief Complaint   Patient presents with    Memory Loss     Pt was diagnosed with dementia 2.5 years ago and has had decline in memory recently. Pt is brought in by son. Son reports that pt went on walk and ended up two miles away from home and couldn't remember how to get home. Pt went to [facility] and was given order for head CT. Son reports pt having dizziness with falls. Pt lives with brother and his wife.        Additional history obtained from: Son provides some of the history.  He states that she was diagnosed with dementia by the primary care physician.  Has seen her primary care physician and her psychiatrist in the past month.  He provided me a list of her current medications.  She was started on 2 new medications though is not entirely sure what they were (presumably Namenda).  He reports that she is having some increasing memory decline, for example earlier this week she got lost on her normal walk.  She typically walks around the block but walks for 2 miles.  He happened to find her at a local grocery store.  Limitation to History:  Select: : None    HPI  Chastity Blanco is a 68 y.o. female who presents to the Emergency Department for memory loss.  The patient has had some increasing memory loss over the past year or longer.  She is supposed to have an outpatient CT, has not had any diagnostic imaging that the family is aware of.  She notes some generalized aches and pains when she walks but otherwise is not having any pain at rest.  She has had 30 pounds of weight loss over the past year.  She notes difficulty sleeping.  She feels that her mental health is actually okay today.  Does smoke cigarettes, has not drank in years but used to drink daily.      External Record Review: Last outpatient note was from the [facility] urgent care for hip pain and headaches.  X-rays were unremarkable.    REVIEW OF SYSTEMS  See HPI.  "    PAST MEDICAL HISTORY   has a past medical history of Bipolar disorder (HCC) (6/16/2016), COPD (chronic obstructive pulmonary disease) (HCC) (6/16/2016), Depression (6/16/2016), thyroid cancer (6/16/2016), Hyperlipidemia (6/16/2016), Thyroid disorder (removed), Tobacco abuse (6/16/2016), and Vitamin D deficiency (6/16/2016).    SURGICAL HISTORY   has a past surgical history that includes thyroidectomy (2006).    SOCIAL HISTORY  Social History     Tobacco Use    Smoking status: Every Day     Current packs/day: 0.50     Average packs/day: 0.5 packs/day for 40.0 years (20.0 ttl pk-yrs)     Types: Cigarettes    Smokeless tobacco: Never    Tobacco comments:     recently restarted smoking   Vaping Use    Vaping Use: Never used   Substance Use Topics    Alcohol use: Yes     Comment: twice in past week    Drug use: No      Social History     Substance and Sexual Activity   Drug Use No       FAMILY HISTORY  Family History   Problem Relation Age of Onset    Cancer Mother         Breast       CURRENT MEDICATIONS  Reviewed.  See Encounter Summary.     ALLERGIES  No Known Allergies    PHYSICAL EXAM  VITAL SIGNS: /73   Pulse 99   Temp 36.6 °C (97.9 °F) (Temporal)   Resp 20   Ht 1.676 m (5' 6\")   Wt 52.8 kg (116 lb 6.5 oz)   SpO2 97%   BMI 18.79 kg/m²   Constitutional: Awake, alert in no apparent distress.  HENT: Normocephalic, Bilateral external ears normal. Nose normal.  Mildly dry mucosal membranes.  Eyes: Conjunctiva normal, non-icteric, EOMI.    Thorax & Lungs: Easy unlabored respirations, Clear to ascultation bilaterally.  Cardiovascular: Not currently tachycardic, regular rate, Regular rhythm, No murmurs, rubs or gallops. Bilateral pulses symmetrical.   Abdomen:  Soft, nontender, nondistended, normal active bowel sounds.   :    Skin: Visualized skin is  Dry, No erythema, No rash.   Musculoskeletal:   No cyanosis, clubbing or edema. No leg asymmetry.   Neurologic: Alert, Grossly non-focal.  Alert and " oriented x 4.  Normal speech, stance, gait, no dysmetria, no drift, cranial nerves II through XII intact.  No visual field deficits.  Psychiatric: Normal affect, Normal mood  Lymphatic:      RADIOLOGY  I have independently interpreted the diagnostic imaging associated with this visit and am waiting the final reading from the radiologist.   My preliminary interpretation is as follows: No intracranial hemorrhage    Radiologist interpretation:   CT-HEAD W/O   Final Result      No CT evidence of acute infarct, hemorrhage or mass.         DX-CHEST-PORTABLE (1 VIEW)   Final Result      No evidence of acute cardiopulmonary process.          COURSE & MEDICAL DECISION MAKING  Pertinent Labs & Imaging studies reviewed. (See chart for details)    COURSE & MEDICAL DECISION MAKING  Pertinent Labs & Imaging studies reviewed. (See chart for details)    Differential diagnoses include but are not limited to: Patient is alert and oriented, neurologically intact, well-appearing, euthymic.  The family is hoping to get a little bit more information as to why she is having early dementia.  Will screen for any infectious process, thyroid issues, head CT.    12:38 PM - Nursing notes reviewed, patient seen and examined at bedside.       Escalation of care considered, and ultimately not performed: acute inpatient care management, however at this time, the patient is most appropriate for outpatient management.      Decision tools and prescription drugs considered including, but not limited to: NIH stroke scale of 0    Decision Making:  This is a pleasant 68 y.o. year old female who presents with some memory loss.  The patient is alert and oriented x 4 today.  She is cooperative, she is not responding to internal stimuli.  From a mental health standpoint, she appears to be doing very well.  The family was interested in getting imaging as she is supposed have an outpatient head CT.  This was completed here in the ER, no signs of advanced  atrophy, CVA, mass occupying lesion or hemorrhage.  Laboratory evaluation relatively unremarkable, mild MARVIN today without recent labs to compare to.  Possibly some mild dehydration.  She was given some fluids here in the ED.    The patient also does have a nitrite positive UTI, while this would not explain any long-term memory deficits, could explain some short-term delirium.  Will treat accordingly.  Recommend follow-up with her primary, has also placed a referral to the neurology memory disorder clinic.    She does have some risk factors for early dementia, notably smoking, alcohol and mental health histories.       The patient was discharged home (see d/c instructions) was told to return immediately for any signs or symptoms listed, or any worsening at all.  The patient verbally agreed to the discharge precautions and follow-up plan which is documented in EPIC.    Discharge Medications:  Discharge Medication List as of 2/3/2024  4:19 PM        START taking these medications    Details   cefdinir (OMNICEF) 300 MG Cap Take 1 Capsule by mouth 2 times a day for 5 days., Disp-10 Capsule, R-0, Normal             FINAL IMPRESSION  1. Acute UTI    2. Mild dementia without behavioral disturbance, psychotic disturbance, mood disturbance, or anxiety, unspecified dementia type (HCC)

## 2024-02-04 NOTE — ED NOTES
Pt d/c home ambulatory with family. Pt given d/c instructions and signed d/c paper. Pt educated on follow up plan and medication usage, pt verbalized understanding of d/c instructions. PT vs stable. Pt had all belongings at d/c.  Pt d/c with son and safe for d/c

## 2025-08-01 ENCOUNTER — HOSPITAL ENCOUNTER (INPATIENT)
Facility: MEDICAL CENTER | Age: 70
LOS: 3 days | DRG: 101 | End: 2025-08-04
Attending: EMERGENCY MEDICINE | Admitting: INTERNAL MEDICINE
Payer: MEDICAID

## 2025-08-01 ENCOUNTER — APPOINTMENT (OUTPATIENT)
Dept: RADIOLOGY | Facility: MEDICAL CENTER | Age: 70
DRG: 101 | End: 2025-08-01
Attending: EMERGENCY MEDICINE
Payer: MEDICAID

## 2025-08-01 DIAGNOSIS — R56.9 SEIZURE (HCC): Primary | ICD-10-CM

## 2025-08-01 DIAGNOSIS — D32.0 MENINGIOMA, CEREBRAL (HCC): ICD-10-CM

## 2025-08-01 DIAGNOSIS — R41.82 ALTERED MENTAL STATUS, UNSPECIFIED ALTERED MENTAL STATUS TYPE: ICD-10-CM

## 2025-08-01 PROBLEM — Z78.9 FULL CODE STATUS: Status: ACTIVE | Noted: 2025-08-01

## 2025-08-01 LAB
ALBUMIN SERPL BCP-MCNC: 3.9 G/DL (ref 3.2–4.9)
ALBUMIN/GLOB SERPL: 1.4 G/DL
ALP SERPL-CCNC: 78 U/L (ref 30–99)
ALT SERPL-CCNC: 13 U/L (ref 2–50)
ANION GAP SERPL CALC-SCNC: 14 MMOL/L (ref 7–16)
APTT PPP: 23.9 SEC (ref 24.7–36)
AST SERPL-CCNC: 20 U/L (ref 12–45)
BASOPHILS # BLD AUTO: 0.1 % (ref 0–1.8)
BASOPHILS # BLD: 0.01 K/UL (ref 0–0.12)
BILIRUB SERPL-MCNC: 0.3 MG/DL (ref 0.1–1.5)
BUN SERPL-MCNC: 16 MG/DL (ref 8–22)
CALCIUM ALBUM COR SERPL-MCNC: 9.1 MG/DL (ref 8.5–10.5)
CALCIUM SERPL-MCNC: 9 MG/DL (ref 8.5–10.5)
CHLORIDE SERPL-SCNC: 106 MMOL/L (ref 96–112)
CO2 SERPL-SCNC: 17 MMOL/L (ref 20–33)
CREAT SERPL-MCNC: 1.43 MG/DL (ref 0.5–1.4)
EKG IMPRESSION: NORMAL
EOSINOPHIL # BLD AUTO: 0.05 K/UL (ref 0–0.51)
EOSINOPHIL NFR BLD: 0.6 % (ref 0–6.9)
ERYTHROCYTE [DISTWIDTH] IN BLOOD BY AUTOMATED COUNT: 44.1 FL (ref 35.9–50)
ETHANOL BLD-MCNC: <10.1 MG/DL
GFR SERPLBLD CREATININE-BSD FMLA CKD-EPI: 40 ML/MIN/1.73 M 2
GLOBULIN SER CALC-MCNC: 2.7 G/DL (ref 1.9–3.5)
GLUCOSE SERPL-MCNC: 149 MG/DL (ref 65–99)
HCT VFR BLD AUTO: 41.7 % (ref 37–47)
HGB BLD-MCNC: 13.8 G/DL (ref 12–16)
IMM GRANULOCYTES # BLD AUTO: 0.03 K/UL (ref 0–0.11)
IMM GRANULOCYTES NFR BLD AUTO: 0.4 % (ref 0–0.9)
INR PPP: 1.02 (ref 0.87–1.13)
LYMPHOCYTES # BLD AUTO: 1.14 K/UL (ref 1–4.8)
LYMPHOCYTES NFR BLD: 14.2 % (ref 22–41)
MCH RBC QN AUTO: 29.7 PG (ref 27–33)
MCHC RBC AUTO-ENTMCNC: 33.1 G/DL (ref 32.2–35.5)
MCV RBC AUTO: 89.9 FL (ref 81.4–97.8)
MONOCYTES # BLD AUTO: 0.56 K/UL (ref 0–0.85)
MONOCYTES NFR BLD AUTO: 7 % (ref 0–13.4)
NEUTROPHILS # BLD AUTO: 6.25 K/UL (ref 1.82–7.42)
NEUTROPHILS NFR BLD: 77.7 % (ref 44–72)
NRBC # BLD AUTO: 0 K/UL
NRBC BLD-RTO: 0 /100 WBC (ref 0–0.2)
PLATELET # BLD AUTO: 191 K/UL (ref 164–446)
PMV BLD AUTO: 10.8 FL (ref 9–12.9)
POTASSIUM SERPL-SCNC: 3.9 MMOL/L (ref 3.6–5.5)
PROT SERPL-MCNC: 6.6 G/DL (ref 6–8.2)
PROTHROMBIN TIME: 13.4 SEC (ref 12–14.6)
RBC # BLD AUTO: 4.64 M/UL (ref 4.2–5.4)
SODIUM SERPL-SCNC: 137 MMOL/L (ref 135–145)
TROPONIN T SERPL-MCNC: 15 NG/L (ref 6–19)
TSH SERPL-ACNC: 1.35 UIU/ML (ref 0.38–5.33)
WBC # BLD AUTO: 8 K/UL (ref 4.8–10.8)

## 2025-08-01 PROCEDURE — 770006 HCHG ROOM/CARE - MED/SURG/GYN SEMI*

## 2025-08-01 PROCEDURE — 700111 HCHG RX REV CODE 636 W/ 250 OVERRIDE (IP): Mod: UD | Performed by: EMERGENCY MEDICINE

## 2025-08-01 PROCEDURE — 85730 THROMBOPLASTIN TIME PARTIAL: CPT

## 2025-08-01 PROCEDURE — 95718 EEG PHYS/QHP 2-12 HR W/VEEG: CPT | Performed by: STUDENT IN AN ORGANIZED HEALTH CARE EDUCATION/TRAINING PROGRAM

## 2025-08-01 PROCEDURE — 36415 COLL VENOUS BLD VENIPUNCTURE: CPT

## 2025-08-01 PROCEDURE — 85610 PROTHROMBIN TIME: CPT

## 2025-08-01 PROCEDURE — 96374 THER/PROPH/DIAG INJ IV PUSH: CPT

## 2025-08-01 PROCEDURE — 99285 EMERGENCY DEPT VISIT HI MDM: CPT

## 2025-08-01 PROCEDURE — A9270 NON-COVERED ITEM OR SERVICE: HCPCS | Performed by: INTERNAL MEDICINE

## 2025-08-01 PROCEDURE — 700102 HCHG RX REV CODE 250 W/ 637 OVERRIDE(OP): Performed by: INTERNAL MEDICINE

## 2025-08-01 PROCEDURE — 84484 ASSAY OF TROPONIN QUANT: CPT

## 2025-08-01 PROCEDURE — 84443 ASSAY THYROID STIM HORMONE: CPT

## 2025-08-01 PROCEDURE — 82077 ASSAY SPEC XCP UR&BREATH IA: CPT

## 2025-08-01 PROCEDURE — 96375 TX/PRO/DX INJ NEW DRUG ADDON: CPT

## 2025-08-01 PROCEDURE — 99223 1ST HOSP IP/OBS HIGH 75: CPT | Performed by: INTERNAL MEDICINE

## 2025-08-01 PROCEDURE — 80053 COMPREHEN METABOLIC PANEL: CPT

## 2025-08-01 PROCEDURE — 700111 HCHG RX REV CODE 636 W/ 250 OVERRIDE (IP): Mod: JZ | Performed by: INTERNAL MEDICINE

## 2025-08-01 PROCEDURE — 95700 EEG CONT REC W/VID EEG TECH: CPT | Performed by: STUDENT IN AN ORGANIZED HEALTH CARE EDUCATION/TRAINING PROGRAM

## 2025-08-01 PROCEDURE — 4A00X4Z MEASUREMENT OF CENTRAL NERVOUS ELECTRICAL ACTIVITY, EXTERNAL APPROACH: ICD-10-PCS | Performed by: STUDENT IN AN ORGANIZED HEALTH CARE EDUCATION/TRAINING PROGRAM

## 2025-08-01 PROCEDURE — 93005 ELECTROCARDIOGRAM TRACING: CPT | Mod: TC | Performed by: EMERGENCY MEDICINE

## 2025-08-01 PROCEDURE — 95714 VEEG EA 12-26 HR UNMNTR: CPT | Performed by: STUDENT IN AN ORGANIZED HEALTH CARE EDUCATION/TRAINING PROGRAM

## 2025-08-01 PROCEDURE — 70450 CT HEAD/BRAIN W/O DYE: CPT

## 2025-08-01 PROCEDURE — 85025 COMPLETE CBC W/AUTO DIFF WBC: CPT

## 2025-08-01 PROCEDURE — 71045 X-RAY EXAM CHEST 1 VIEW: CPT

## 2025-08-01 RX ORDER — HYDRALAZINE HYDROCHLORIDE 20 MG/ML
10 INJECTION INTRAMUSCULAR; INTRAVENOUS EVERY 4 HOURS PRN
Status: DISCONTINUED | OUTPATIENT
Start: 2025-08-01 | End: 2025-08-04 | Stop reason: HOSPADM

## 2025-08-01 RX ORDER — LEVOTHYROXINE SODIUM 137 UG/1
137 TABLET ORAL
COMMUNITY
Start: 2025-07-17

## 2025-08-01 RX ORDER — HYDROXYZINE HYDROCHLORIDE 25 MG/1
25-50 TABLET, FILM COATED ORAL 2 TIMES DAILY PRN
Status: DISCONTINUED | OUTPATIENT
Start: 2025-08-01 | End: 2025-08-04 | Stop reason: HOSPADM

## 2025-08-01 RX ORDER — ENOXAPARIN SODIUM 100 MG/ML
40 INJECTION SUBCUTANEOUS DAILY
Status: DISCONTINUED | OUTPATIENT
Start: 2025-08-01 | End: 2025-08-04 | Stop reason: HOSPADM

## 2025-08-01 RX ORDER — ACETAMINOPHEN 325 MG/1
650 TABLET ORAL EVERY 6 HOURS PRN
Status: DISCONTINUED | OUTPATIENT
Start: 2025-08-01 | End: 2025-08-04 | Stop reason: HOSPADM

## 2025-08-01 RX ORDER — DIAZEPAM 10 MG/2ML
5 INJECTION, SOLUTION INTRAMUSCULAR; INTRAVENOUS EVERY 6 HOURS PRN
Status: DISCONTINUED | OUTPATIENT
Start: 2025-08-01 | End: 2025-08-04 | Stop reason: HOSPADM

## 2025-08-01 RX ORDER — POLYETHYLENE GLYCOL 3350 17 G/17G
1 POWDER, FOR SOLUTION ORAL
Status: DISCONTINUED | OUTPATIENT
Start: 2025-08-01 | End: 2025-08-04 | Stop reason: HOSPADM

## 2025-08-01 RX ORDER — LEVETIRACETAM 500 MG/5ML
1500 INJECTION, SOLUTION, CONCENTRATE INTRAVENOUS ONCE
Status: COMPLETED | OUTPATIENT
Start: 2025-08-01 | End: 2025-08-01

## 2025-08-01 RX ORDER — TRAZODONE HYDROCHLORIDE 100 MG/1
100 TABLET ORAL
Status: DISCONTINUED | OUTPATIENT
Start: 2025-08-01 | End: 2025-08-04 | Stop reason: HOSPADM

## 2025-08-01 RX ORDER — LEVOTHYROXINE SODIUM 137 UG/1
137 TABLET ORAL
Status: DISCONTINUED | OUTPATIENT
Start: 2025-08-01 | End: 2025-08-04 | Stop reason: HOSPADM

## 2025-08-01 RX ORDER — AMOXICILLIN 250 MG
2 CAPSULE ORAL EVERY EVENING
Status: DISCONTINUED | OUTPATIENT
Start: 2025-08-01 | End: 2025-08-04 | Stop reason: HOSPADM

## 2025-08-01 RX ORDER — MEMANTINE HYDROCHLORIDE 10 MG/1
10 TABLET ORAL 2 TIMES DAILY
Status: DISCONTINUED | OUTPATIENT
Start: 2025-08-01 | End: 2025-08-04 | Stop reason: HOSPADM

## 2025-08-01 RX ORDER — LEVETIRACETAM 500 MG/5ML
750 INJECTION, SOLUTION, CONCENTRATE INTRAVENOUS EVERY 12 HOURS
Status: DISCONTINUED | OUTPATIENT
Start: 2025-08-01 | End: 2025-08-03

## 2025-08-01 RX ORDER — OLANZAPINE 5 MG/1
5 TABLET, FILM COATED ORAL
Status: DISCONTINUED | OUTPATIENT
Start: 2025-08-01 | End: 2025-08-04 | Stop reason: HOSPADM

## 2025-08-01 RX ADMIN — OLANZAPINE 5 MG: 5 TABLET, FILM COATED ORAL at 21:13

## 2025-08-01 RX ADMIN — LEVOTHYROXINE SODIUM 137 MCG: 0.14 TABLET ORAL at 18:49

## 2025-08-01 RX ADMIN — TRAZODONE HYDROCHLORIDE 100 MG: 100 TABLET ORAL at 21:13

## 2025-08-01 RX ADMIN — LEVETIRACETAM 750 MG: 100 INJECTION, SOLUTION INTRAVENOUS at 18:41

## 2025-08-01 RX ADMIN — MEMANTINE 10 MG: 10 TABLET ORAL at 18:43

## 2025-08-01 RX ADMIN — LEVETIRACETAM 1500 MG: 100 INJECTION, SOLUTION INTRAVENOUS at 15:35

## 2025-08-01 RX ADMIN — ENOXAPARIN SODIUM 40 MG: 100 INJECTION SUBCUTANEOUS at 18:41

## 2025-08-01 RX ADMIN — DIAZEPAM 5 MG: 10 INJECTION, SOLUTION INTRAMUSCULAR; INTRAVENOUS at 14:20

## 2025-08-01 ASSESSMENT — ENCOUNTER SYMPTOMS
MYALGIAS: 0
ORTHOPNEA: 0
NECK PAIN: 0
VOMITING: 0
LOSS OF CONSCIOUSNESS: 1
NAUSEA: 0
TREMORS: 0
SPEECH CHANGE: 0
HALLUCINATIONS: 0
COUGH: 0
SPUTUM PRODUCTION: 0
DIARRHEA: 0
TINGLING: 0
SHORTNESS OF BREATH: 0
ABDOMINAL PAIN: 0
FOCAL WEAKNESS: 0
CHILLS: 0
BACK PAIN: 0
PHOTOPHOBIA: 0
PALPITATIONS: 0
WEIGHT LOSS: 0
FEVER: 0
DIZZINESS: 0
DOUBLE VISION: 0
CONSTIPATION: 0
SEIZURES: 1
HEADACHES: 0
BLURRED VISION: 0
SENSORY CHANGE: 0
EYE PAIN: 0

## 2025-08-01 ASSESSMENT — FIBROSIS 4 INDEX: FIB4 SCORE: 1.14

## 2025-08-01 ASSESSMENT — HEART SCORE
AGE: 65+
ECG: NORMAL
HEART SCORE: 2
HISTORY: SLIGHTLY SUSPICIOUS
TROPONIN: LESS THAN OR EQUAL TO NORMAL LIMIT
RISK FACTORS: NO KNOWN RISK FACTORS

## 2025-08-01 ASSESSMENT — PAIN DESCRIPTION - PAIN TYPE: TYPE: ACUTE PAIN

## 2025-08-01 ASSESSMENT — LIFESTYLE VARIABLES: SUBSTANCE_ABUSE: 0

## 2025-08-02 ENCOUNTER — APPOINTMENT (OUTPATIENT)
Dept: RADIOLOGY | Facility: MEDICAL CENTER | Age: 70
DRG: 101 | End: 2025-08-02
Payer: MEDICAID

## 2025-08-02 LAB
ALBUMIN SERPL BCP-MCNC: 3.5 G/DL (ref 3.2–4.9)
ALBUMIN/GLOB SERPL: 1.3 G/DL
ALP SERPL-CCNC: 74 U/L (ref 30–99)
ALT SERPL-CCNC: 12 U/L (ref 2–50)
ANION GAP SERPL CALC-SCNC: 15 MMOL/L (ref 7–16)
AST SERPL-CCNC: 33 U/L (ref 12–45)
BILIRUB SERPL-MCNC: 0.4 MG/DL (ref 0.1–1.5)
BUN SERPL-MCNC: 17 MG/DL (ref 8–22)
CALCIUM ALBUM COR SERPL-MCNC: 9.5 MG/DL (ref 8.5–10.5)
CALCIUM SERPL-MCNC: 9.1 MG/DL (ref 8.5–10.5)
CHLORIDE SERPL-SCNC: 107 MMOL/L (ref 96–112)
CO2 SERPL-SCNC: 17 MMOL/L (ref 20–33)
CREAT SERPL-MCNC: 1.23 MG/DL (ref 0.5–1.4)
ERYTHROCYTE [DISTWIDTH] IN BLOOD BY AUTOMATED COUNT: 45.7 FL (ref 35.9–50)
GFR SERPLBLD CREATININE-BSD FMLA CKD-EPI: 48 ML/MIN/1.73 M 2
GLOBULIN SER CALC-MCNC: 2.7 G/DL (ref 1.9–3.5)
GLUCOSE SERPL-MCNC: 73 MG/DL (ref 65–99)
HCT VFR BLD AUTO: 41.9 % (ref 37–47)
HGB BLD-MCNC: 13.5 G/DL (ref 12–16)
MAGNESIUM SERPL-MCNC: 2.3 MG/DL (ref 1.5–2.5)
MCH RBC QN AUTO: 29.7 PG (ref 27–33)
MCHC RBC AUTO-ENTMCNC: 32.2 G/DL (ref 32.2–35.5)
MCV RBC AUTO: 92.1 FL (ref 81.4–97.8)
PLATELET # BLD AUTO: 172 K/UL (ref 164–446)
PMV BLD AUTO: 10.7 FL (ref 9–12.9)
POTASSIUM SERPL-SCNC: 4.5 MMOL/L (ref 3.6–5.5)
PROT SERPL-MCNC: 6.2 G/DL (ref 6–8.2)
RBC # BLD AUTO: 4.55 M/UL (ref 4.2–5.4)
SODIUM SERPL-SCNC: 139 MMOL/L (ref 135–145)
TSH SERPL DL<=0.005 MIU/L-ACNC: 0.66 UIU/ML (ref 0.38–5.33)
WBC # BLD AUTO: 7.1 K/UL (ref 4.8–10.8)

## 2025-08-02 PROCEDURE — 99233 SBSQ HOSP IP/OBS HIGH 50: CPT

## 2025-08-02 PROCEDURE — 36415 COLL VENOUS BLD VENIPUNCTURE: CPT

## 2025-08-02 PROCEDURE — 84443 ASSAY THYROID STIM HORMONE: CPT

## 2025-08-02 PROCEDURE — 700111 HCHG RX REV CODE 636 W/ 250 OVERRIDE (IP): Performed by: INTERNAL MEDICINE

## 2025-08-02 PROCEDURE — 83735 ASSAY OF MAGNESIUM: CPT

## 2025-08-02 PROCEDURE — 99254 IP/OBS CNSLTJ NEW/EST MOD 60: CPT | Performed by: PSYCHIATRY & NEUROLOGY

## 2025-08-02 PROCEDURE — A9270 NON-COVERED ITEM OR SERVICE: HCPCS | Performed by: INTERNAL MEDICINE

## 2025-08-02 PROCEDURE — 80053 COMPREHEN METABOLIC PANEL: CPT

## 2025-08-02 PROCEDURE — 700102 HCHG RX REV CODE 250 W/ 637 OVERRIDE(OP): Performed by: INTERNAL MEDICINE

## 2025-08-02 PROCEDURE — 770006 HCHG ROOM/CARE - MED/SURG/GYN SEMI*

## 2025-08-02 PROCEDURE — 85027 COMPLETE CBC AUTOMATED: CPT

## 2025-08-02 RX ADMIN — LEVETIRACETAM 750 MG: 100 INJECTION, SOLUTION INTRAVENOUS at 17:13

## 2025-08-02 RX ADMIN — TRAZODONE HYDROCHLORIDE 100 MG: 100 TABLET ORAL at 20:44

## 2025-08-02 RX ADMIN — SENNOSIDES, DOCUSATE SODIUM 2 TABLET: 50; 8.6 TABLET, FILM COATED ORAL at 17:13

## 2025-08-02 RX ADMIN — LEVETIRACETAM 750 MG: 100 INJECTION, SOLUTION INTRAVENOUS at 07:49

## 2025-08-02 RX ADMIN — MEMANTINE 10 MG: 10 TABLET ORAL at 07:49

## 2025-08-02 RX ADMIN — LEVOTHYROXINE SODIUM 137 MCG: 0.14 TABLET ORAL at 07:49

## 2025-08-02 RX ADMIN — ENOXAPARIN SODIUM 40 MG: 100 INJECTION SUBCUTANEOUS at 17:14

## 2025-08-02 RX ADMIN — MEMANTINE 10 MG: 10 TABLET ORAL at 17:13

## 2025-08-02 RX ADMIN — HYDROXYZINE HYDROCHLORIDE 50 MG: 25 TABLET ORAL at 10:06

## 2025-08-02 RX ADMIN — OLANZAPINE 5 MG: 5 TABLET, FILM COATED ORAL at 20:44

## 2025-08-02 ASSESSMENT — PAIN DESCRIPTION - PAIN TYPE
TYPE: ACUTE PAIN
TYPE: ACUTE PAIN

## 2025-08-03 ENCOUNTER — APPOINTMENT (OUTPATIENT)
Dept: RADIOLOGY | Facility: MEDICAL CENTER | Age: 70
DRG: 101 | End: 2025-08-03
Payer: MEDICAID

## 2025-08-03 LAB
ANION GAP SERPL CALC-SCNC: 9 MMOL/L (ref 7–16)
BUN SERPL-MCNC: 23 MG/DL (ref 8–22)
CALCIUM SERPL-MCNC: 9 MG/DL (ref 8.5–10.5)
CHLORIDE SERPL-SCNC: 105 MMOL/L (ref 96–112)
CO2 SERPL-SCNC: 25 MMOL/L (ref 20–33)
CREAT SERPL-MCNC: 1.21 MG/DL (ref 0.5–1.4)
ERYTHROCYTE [DISTWIDTH] IN BLOOD BY AUTOMATED COUNT: 44.9 FL (ref 35.9–50)
GFR SERPLBLD CREATININE-BSD FMLA CKD-EPI: 49 ML/MIN/1.73 M 2
GLUCOSE SERPL-MCNC: 88 MG/DL (ref 65–99)
HCT VFR BLD AUTO: 41.4 % (ref 37–47)
HGB BLD-MCNC: 13.4 G/DL (ref 12–16)
MAGNESIUM SERPL-MCNC: 2.1 MG/DL (ref 1.5–2.5)
MCH RBC QN AUTO: 29.6 PG (ref 27–33)
MCHC RBC AUTO-ENTMCNC: 32.4 G/DL (ref 32.2–35.5)
MCV RBC AUTO: 91.4 FL (ref 81.4–97.8)
PHOSPHATE SERPL-MCNC: 5 MG/DL (ref 2.5–4.5)
PLATELET # BLD AUTO: 188 K/UL (ref 164–446)
PMV BLD AUTO: 10.9 FL (ref 9–12.9)
POTASSIUM SERPL-SCNC: 4.4 MMOL/L (ref 3.6–5.5)
RBC # BLD AUTO: 4.53 M/UL (ref 4.2–5.4)
SODIUM SERPL-SCNC: 139 MMOL/L (ref 135–145)
WBC # BLD AUTO: 6.5 K/UL (ref 4.8–10.8)

## 2025-08-03 PROCEDURE — 70553 MRI BRAIN STEM W/O & W/DYE: CPT

## 2025-08-03 PROCEDURE — 700102 HCHG RX REV CODE 250 W/ 637 OVERRIDE(OP): Performed by: INTERNAL MEDICINE

## 2025-08-03 PROCEDURE — 700117 HCHG RX CONTRAST REV CODE 255: Mod: JZ

## 2025-08-03 PROCEDURE — 84100 ASSAY OF PHOSPHORUS: CPT

## 2025-08-03 PROCEDURE — 85027 COMPLETE CBC AUTOMATED: CPT

## 2025-08-03 PROCEDURE — A9270 NON-COVERED ITEM OR SERVICE: HCPCS | Performed by: INTERNAL MEDICINE

## 2025-08-03 PROCEDURE — 83735 ASSAY OF MAGNESIUM: CPT

## 2025-08-03 PROCEDURE — 99406 BEHAV CHNG SMOKING 3-10 MIN: CPT

## 2025-08-03 PROCEDURE — 99232 SBSQ HOSP IP/OBS MODERATE 35: CPT

## 2025-08-03 PROCEDURE — 36415 COLL VENOUS BLD VENIPUNCTURE: CPT

## 2025-08-03 PROCEDURE — A9579 GAD-BASE MR CONTRAST NOS,1ML: HCPCS | Mod: JZ

## 2025-08-03 PROCEDURE — 80048 BASIC METABOLIC PNL TOTAL CA: CPT

## 2025-08-03 PROCEDURE — 700111 HCHG RX REV CODE 636 W/ 250 OVERRIDE (IP): Mod: JZ | Performed by: INTERNAL MEDICINE

## 2025-08-03 PROCEDURE — 770006 HCHG ROOM/CARE - MED/SURG/GYN SEMI*

## 2025-08-03 PROCEDURE — 700111 HCHG RX REV CODE 636 W/ 250 OVERRIDE (IP): Mod: JZ

## 2025-08-03 RX ORDER — GADOTERIDOL 279.3 MG/ML
13 INJECTION INTRAVENOUS ONCE
Status: COMPLETED | OUTPATIENT
Start: 2025-08-03 | End: 2025-08-03

## 2025-08-03 RX ORDER — DIAZEPAM 10 MG/2ML
5 INJECTION, SOLUTION INTRAMUSCULAR; INTRAVENOUS ONCE
Status: COMPLETED | OUTPATIENT
Start: 2025-08-03 | End: 2025-08-03

## 2025-08-03 RX ADMIN — DIAZEPAM 5 MG: 5 INJECTION, SOLUTION INTRAMUSCULAR; INTRAVENOUS at 14:52

## 2025-08-03 RX ADMIN — MEMANTINE 10 MG: 10 TABLET ORAL at 17:03

## 2025-08-03 RX ADMIN — LEVETIRACETAM 750 MG: 500 TABLET, FILM COATED ORAL at 17:03

## 2025-08-03 RX ADMIN — ENOXAPARIN SODIUM 40 MG: 100 INJECTION SUBCUTANEOUS at 17:03

## 2025-08-03 RX ADMIN — LEVETIRACETAM 750 MG: 500 TABLET, FILM COATED ORAL at 05:23

## 2025-08-03 RX ADMIN — TRAZODONE HYDROCHLORIDE 100 MG: 100 TABLET ORAL at 20:37

## 2025-08-03 RX ADMIN — OLANZAPINE 5 MG: 5 TABLET, FILM COATED ORAL at 20:37

## 2025-08-03 RX ADMIN — GADOTERIDOL 13 ML: 279.3 INJECTION, SOLUTION INTRAVENOUS at 15:48

## 2025-08-03 RX ADMIN — MEMANTINE 10 MG: 10 TABLET ORAL at 04:46

## 2025-08-03 RX ADMIN — LEVOTHYROXINE SODIUM 137 MCG: 0.14 TABLET ORAL at 04:46

## 2025-08-03 ASSESSMENT — COGNITIVE AND FUNCTIONAL STATUS - GENERAL
DRESSING REGULAR LOWER BODY CLOTHING: A LITTLE
DAILY ACTIVITIY SCORE: 18
SUGGESTED CMS G CODE MODIFIER MOBILITY: CK
HELP NEEDED FOR BATHING: A LITTLE
CLIMB 3 TO 5 STEPS WITH RAILING: A LITTLE
TOILETING: A LITTLE
MOBILITY SCORE: 18
EATING MEALS: A LITTLE
PERSONAL GROOMING: A LITTLE
WALKING IN HOSPITAL ROOM: A LITTLE
MOVING FROM LYING ON BACK TO SITTING ON SIDE OF FLAT BED: A LITTLE
DRESSING REGULAR UPPER BODY CLOTHING: A LITTLE
MOVING TO AND FROM BED TO CHAIR: A LITTLE
STANDING UP FROM CHAIR USING ARMS: A LITTLE
TURNING FROM BACK TO SIDE WHILE IN FLAT BAD: A LITTLE
SUGGESTED CMS G CODE MODIFIER DAILY ACTIVITY: CK

## 2025-08-03 ASSESSMENT — PAIN DESCRIPTION - PAIN TYPE
TYPE: ACUTE PAIN
TYPE: ACUTE PAIN

## 2025-08-03 ASSESSMENT — FIBROSIS 4 INDEX: FIB4 SCORE: 3.45

## 2025-08-04 VITALS
SYSTOLIC BLOOD PRESSURE: 102 MMHG | OXYGEN SATURATION: 96 % | HEART RATE: 78 BPM | WEIGHT: 153.88 LBS | HEIGHT: 67 IN | RESPIRATION RATE: 17 BRPM | TEMPERATURE: 97.7 F | DIASTOLIC BLOOD PRESSURE: 74 MMHG | BODY MASS INDEX: 24.15 KG/M2

## 2025-08-04 PROBLEM — D32.9 MENINGIOMA (HCC): Status: ACTIVE | Noted: 2025-08-04

## 2025-08-04 PROBLEM — R56.9 NEW ONSET SEIZURE (HCC): Status: RESOLVED | Noted: 2025-08-01 | Resolved: 2025-08-04

## 2025-08-04 PROBLEM — D32.0 MENINGIOMA, CEREBRAL (HCC): Status: ACTIVE | Noted: 2025-08-04

## 2025-08-04 PROCEDURE — 99232 SBSQ HOSP IP/OBS MODERATE 35: CPT

## 2025-08-04 PROCEDURE — 700102 HCHG RX REV CODE 250 W/ 637 OVERRIDE(OP): Performed by: INTERNAL MEDICINE

## 2025-08-04 PROCEDURE — A9270 NON-COVERED ITEM OR SERVICE: HCPCS | Performed by: INTERNAL MEDICINE

## 2025-08-04 PROCEDURE — 99239 HOSP IP/OBS DSCHRG MGMT >30: CPT

## 2025-08-04 RX ORDER — LEVETIRACETAM 750 MG/1
750 TABLET ORAL EVERY 12 HOURS
Qty: 60 TABLET | Refills: 3 | Status: SHIPPED | OUTPATIENT
Start: 2025-08-04

## 2025-08-04 RX ADMIN — MEMANTINE 10 MG: 10 TABLET ORAL at 05:23

## 2025-08-04 RX ADMIN — LEVETIRACETAM 750 MG: 500 TABLET, FILM COATED ORAL at 05:23

## 2025-08-04 RX ADMIN — LEVOTHYROXINE SODIUM 137 MCG: 0.14 TABLET ORAL at 05:23

## 2025-08-04 RX ADMIN — POLYETHYLENE GLYCOL 3350 1 PACKET: 17 POWDER, FOR SOLUTION ORAL at 05:23

## 2025-08-04 ASSESSMENT — PAIN DESCRIPTION - PAIN TYPE: TYPE: ACUTE PAIN
